# Patient Record
Sex: MALE | Race: BLACK OR AFRICAN AMERICAN | Employment: OTHER | URBAN - METROPOLITAN AREA
[De-identification: names, ages, dates, MRNs, and addresses within clinical notes are randomized per-mention and may not be internally consistent; named-entity substitution may affect disease eponyms.]

---

## 2019-05-16 ENCOUNTER — OP HISTORICAL/CONVERTED ENCOUNTER (OUTPATIENT)
Dept: OTHER | Age: 59
End: 2019-05-16

## 2019-05-24 ENCOUNTER — ED HISTORICAL/CONVERTED ENCOUNTER (OUTPATIENT)
Dept: OTHER | Age: 59
End: 2019-05-24

## 2020-01-30 ENCOUNTER — ED HISTORICAL/CONVERTED ENCOUNTER (OUTPATIENT)
Dept: OTHER | Age: 60
End: 2020-01-30

## 2020-11-05 ENCOUNTER — HOSPITAL ENCOUNTER (EMERGENCY)
Age: 60
Discharge: HOME | End: 2020-11-05
Attending: EMERGENCY MEDICINE
Payer: MEDICARE

## 2020-11-05 VITALS
RESPIRATION RATE: 16 BRPM | BODY MASS INDEX: 40.16 KG/M2 | WEIGHT: 265 LBS | SYSTOLIC BLOOD PRESSURE: 144 MMHG | TEMPERATURE: 98.4 F | HEART RATE: 66 BPM | HEIGHT: 68 IN | OXYGEN SATURATION: 97 % | DIASTOLIC BLOOD PRESSURE: 94 MMHG

## 2020-11-05 DIAGNOSIS — E08.649: Primary | ICD-10-CM

## 2020-11-05 LAB
ALBUMIN SERPL-MCNC: 3.8 G/DL (ref 3.5–5)
ALBUMIN/GLOB SERPL: 0.9 {RATIO} (ref 1.1–2.2)
ALP SERPL-CCNC: 96 U/L (ref 45–117)
ALT SERPL-CCNC: 21 U/L (ref 12–78)
ANION GAP SERPL CALC-SCNC: 8 MMOL/L (ref 5–15)
AST SERPL W P-5'-P-CCNC: 34 U/L (ref 15–37)
BASOPHILS # BLD: 0 K/UL (ref 0–0.2)
BASOPHILS NFR BLD: 0 % (ref 0–2.5)
BILIRUB SERPL-MCNC: 1.1 MG/DL (ref 0.2–1)
BUN SERPL-MCNC: 12 MG/DL (ref 6–20)
BUN/CREAT SERPL: 10 (ref 12–20)
CA-I BLD-MCNC: 9.1 MG/DL (ref 8.5–10.1)
CHLORIDE SERPL-SCNC: 106 MMOL/L (ref 97–108)
CO2 SERPL-SCNC: 27 MMOL/L (ref 21–32)
CREAT SERPL-MCNC: 1.24 MG/DL (ref 0.7–1.3)
EOSINOPHIL # BLD: 0 K/UL (ref 0–0.7)
EOSINOPHIL NFR BLD: 0 % (ref 0.9–2.9)
ERYTHROCYTE [DISTWIDTH] IN BLOOD BY AUTOMATED COUNT: 15.2 % (ref 11.5–14.5)
GLOBULIN SER CALC-MCNC: 4.1 G/DL (ref 2–4)
GLUCOSE BLD STRIP.AUTO-MCNC: 117 MG/DL (ref 65–100)
GLUCOSE BLD STRIP.AUTO-MCNC: 129 MG/DL (ref 65–100)
GLUCOSE SERPL-MCNC: 148 MG/DL (ref 65–100)
HCT VFR BLD AUTO: 46 % (ref 41–53)
HGB BLD-MCNC: 15.2 G/DL (ref 13.5–17.5)
LYMPHOCYTES # BLD: 1.3 K/UL (ref 1–4.8)
LYMPHOCYTES NFR BLD: 13 % (ref 20.5–51.1)
MCH RBC QN AUTO: 29.4 PG (ref 31–34)
MCHC RBC AUTO-ENTMCNC: 33 G/DL (ref 31–36)
MCV RBC AUTO: 89.1 FL (ref 80–100)
MONOCYTES # BLD: 0.5 K/UL (ref 0.2–2.4)
MONOCYTES NFR BLD: 5 % (ref 1.7–9.3)
NEUTS SEG # BLD: 7.9 K/UL (ref 1.8–7.7)
NEUTS SEG NFR BLD: 82 % (ref 42–75)
NRBC # BLD: 0 K/UL
NRBC BLD-RTO: 0 PER 100 WBC
PERFORMED BY, TECHID: ABNORMAL
PERFORMED BY, TECHID: ABNORMAL
PLATELET # BLD AUTO: 186 K/UL
PMV BLD AUTO: 9.7 FL (ref 6.5–11.5)
POTASSIUM SERPL-SCNC: 4.8 MMOL/L (ref 3.5–5.1)
PROT SERPL-MCNC: 7.9 G/DL (ref 6.4–8.2)
RBC # BLD AUTO: 5.16 M/UL (ref 4.5–5.9)
SODIUM SERPL-SCNC: 141 MMOL/L (ref 136–145)
WBC # BLD AUTO: 9.7 K/UL (ref 4.4–11.3)

## 2020-11-05 PROCEDURE — 85025 COMPLETE CBC W/AUTO DIFF WBC: CPT

## 2020-11-05 PROCEDURE — 36415 COLL VENOUS BLD VENIPUNCTURE: CPT

## 2020-11-05 PROCEDURE — 74011250637 HC RX REV CODE- 250/637: Performed by: EMERGENCY MEDICINE

## 2020-11-05 PROCEDURE — 99285 EMERGENCY DEPT VISIT HI MDM: CPT

## 2020-11-05 PROCEDURE — 82962 GLUCOSE BLOOD TEST: CPT

## 2020-11-05 PROCEDURE — 80053 COMPREHEN METABOLIC PANEL: CPT

## 2020-11-05 RX ORDER — NIFEDIPINE 30 MG/1
30 TABLET, EXTENDED RELEASE ORAL
Status: COMPLETED | OUTPATIENT
Start: 2020-11-05 | End: 2020-11-05

## 2020-11-05 RX ORDER — NIFEDIPINE 30 MG/1
30 TABLET, EXTENDED RELEASE ORAL DAILY
Status: DISCONTINUED | OUTPATIENT
Start: 2020-11-06 | End: 2020-11-05 | Stop reason: SDUPTHER

## 2020-11-05 RX ADMIN — NIFEDIPINE 30 MG: 30 TABLET, EXTENDED RELEASE ORAL at 18:55

## 2020-11-05 NOTE — ED TRIAGE NOTES
EMS called for unresponsive. Patients BG was 27 on arrival, unresponsive with snoring respirations. Given glucagon and D50. Last .  Pt now A/O

## 2020-11-05 NOTE — ED NOTES
Pt states he did not have anything to eat or drink today. He did not check his blood glucose prior to administering insulin. Pt also reports eating a small amount yesterday and taking scheduled insulin. Pt stated he fell yesterday but did not remember doing so. Pt denies any injury.

## 2020-11-06 NOTE — ED PROVIDER NOTES
HPI   Patient is a 63 y.o. male [address] who presents to the ER with a chief complaint of low blood sugars, failed to eat meals today  Chief Complaint   Patient presents with    Low Blood Sugar      Past Medical History:   Diagnosis Date    Chronic kidney disease     Diabetes ([address]     Hypertension        Past Surgical History:   Procedure Laterality Date    HX HIP REPLACEMENT      HX ORTHOPAEDIC           Family History:   Family history unknown: Yes       Social History     Socioeconomic History    Marital status: SINGLE     Spouse name: Not on file    Number of children: Not on file    Years of education: Not on file    Highest education level: Not on file   Occupational History    Not on file   Social Needs    Financial resource strain: Not on file    Food insecurity     Worry: Not on file     Inability: Not on file    Transportation needs     Medical: Not on file     Non-medical: Not on file   Tobacco Use    Smoking status: Never Smoker    Smokeless tobacco: Never Used   Substance and Sexual Activity    Alcohol use: Yes    Drug use: Not Currently    Sexual activity: Not Currently   Lifestyle    Physical activity     Days per week: Not on file     Minutes per session: Not on file    Stress: Not on file   Relationships    Social connections     Talks on phone: Not on file     Gets together: Not on file     Attends Mosque service: Not on file     Active member of club or organization: Not on file     Attends meetings of clubs or organizations: Not on file     Relationship status: Not on file    Intimate partner violence     Fear of current or ex partner: Not on file     Emotionally abused: Not on file     Physically abused: Not on file     Forced sexual activity: Not on file   Other Topics Concern    Not on file   Social History Narrative    Not on file         ALLERGIES: Patient has no known allergies. Review of Systems   Constitutional: Negative. HENT: Negative. Eyes: Negative. Respiratory: Negative. Cardiovascular: Negative. Gastrointestinal: Negative. Endocrine: Negative. Genitourinary: Negative. Musculoskeletal: Negative. Allergic/Immunologic: Negative. Neurological: Negative. Hematological: Negative. Psychiatric/Behavioral: Negative. Vitals:    11/05/20 1830 11/05/20 1855 11/05/20 1900 11/05/20 1930   BP: (!) 162/95 (!) 162/95 (!) 185/109 (!) 144/94   Pulse: 70 77 82 66   Resp: 18  16 16   Temp:       SpO2: 98%  98% 97%   Weight:       Height:                Physical Exam  Vitals signs and nursing note reviewed. Constitutional:       Appearance: Normal appearance. He is normal weight. HENT:      Head: Normocephalic. Right Ear: Tympanic membrane normal.      Left Ear: Tympanic membrane normal.      Nose: Nose normal.      Mouth/Throat:      Mouth: Mucous membranes are moist.   Eyes:      Extraocular Movements: Extraocular movements intact. Pupils: Pupils are equal, round, and reactive to light. Neck:      Musculoskeletal: Normal range of motion and neck supple. Cardiovascular:      Rate and Rhythm: Normal rate and regular rhythm. Pulses: Normal pulses. Heart sounds: Normal heart sounds. Pulmonary:      Effort: Pulmonary effort is normal.      Breath sounds: Normal breath sounds. Abdominal:      General: Abdomen is flat. Bowel sounds are normal.      Palpations: Abdomen is soft. Musculoskeletal: Normal range of motion. Neurological:      General: No focal deficit present. Mental Status: He is alert and oriented to person, place, and time. Symptoms improved, sugars much improved    MDM       Procedures      ICD-10-CM ICD-9-CM    1.  Diabetes mellitus due to underlying condition with hypoglycemia without coma, unspecified whether long term insulin use ([facility])  E08.649 249.80

## 2020-11-06 NOTE — ED NOTES
The patient is sitting on the bed talking to staff and eating a sandwich tray. He is alert and oriented X 4 with even rise and fall of the chest noted.

## 2020-12-21 ENCOUNTER — HOSPITAL ENCOUNTER (EMERGENCY)
Age: 60
Discharge: HOME | End: 2020-12-21
Attending: EMERGENCY MEDICINE
Payer: MEDICARE

## 2020-12-21 VITALS
OXYGEN SATURATION: 100 % | SYSTOLIC BLOOD PRESSURE: 136 MMHG | TEMPERATURE: 98.1 F | RESPIRATION RATE: 18 BRPM | BODY MASS INDEX: 40.16 KG/M2 | HEART RATE: 85 BPM | WEIGHT: 265 LBS | DIASTOLIC BLOOD PRESSURE: 89 MMHG | HEIGHT: 68 IN
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
## 2020-12-21 PROCEDURE — 90714 TD VACC NO PRESV 7 YRS+ IM: CPT | Performed by: EMERGENCY MEDICINE

## 2020-12-21 PROCEDURE — 90471 IMMUNIZATION ADMIN: CPT

## 2020-12-21 PROCEDURE — 74011250637 HC RX REV CODE- 250/637: Performed by: EMERGENCY MEDICINE

## 2020-12-21 PROCEDURE — 99283 EMERGENCY DEPT VISIT LOW MDM: CPT

## 2020-12-21 PROCEDURE — 74011250636 HC RX REV CODE- 250/636: Performed by: EMERGENCY MEDICINE

## 2020-12-21 RX ORDER — POTASSIUM CHLORIDE 20 MEQ/1
TABLET, EXTENDED RELEASE ORAL 2 TIMES DAILY
COMMUNITY

## 2020-12-21 RX ORDER — FUROSEMIDE 40 MG/1
40 TABLET ORAL 2 TIMES DAILY
COMMUNITY
End: 2021-12-12

## 2020-12-21 RX ORDER — CEPHALEXIN 500 MG/1
500 CAPSULE ORAL 3 TIMES DAILY
Qty: 21 CAP | Refills: 0 | Status: SHIPPED | OUTPATIENT
Start: 2020-12-21 | End: 2020-12-28

## 2020-12-21 RX ORDER — CEPHALEXIN 250 MG/1
500 CAPSULE ORAL
Status: COMPLETED | OUTPATIENT
Start: 2020-12-21 | End: 2020-12-21

## 2020-12-21 RX ADMIN — TETANUS AND DIPHTHERIA TOXOIDS ADSORBED 0.5 ML: 2; 2 INJECTION INTRAMUSCULAR at 01:29

## 2020-12-21 RX ADMIN — CEPHALEXIN 500 MG: 250 CAPSULE ORAL at 01:29

## 2020-12-21 NOTE — ED PROVIDER NOTES
Patient C/O bed bug bites to his legs tonight. He sprayed them. No other complaints. Past Medical History:   Diagnosis Date    Chronic kidney disease     Diabetes ([address]     Hypertension        Past Surgical History:   Procedure Laterality Date    HX HIP REPLACEMENT      HX ORTHOPAEDIC           Family History:   Family history unknown: Yes       Social History     Socioeconomic History    Marital status: SINGLE     Spouse name: Not on file    Number of children: Not on file    Years of education: Not on file    Highest education level: Not on file   Occupational History    Not on file   Social Needs    Financial resource strain: Not on file    Food insecurity     Worry: Not on file     Inability: Not on file    Transportation needs     Medical: Not on file     Non-medical: Not on file   Tobacco Use    Smoking status: Never Smoker    Smokeless tobacco: Never Used   Substance and Sexual Activity    Alcohol use: Yes    Drug use: Not Currently    Sexual activity: Not Currently   Lifestyle    Physical activity     Days per week: Not on file     Minutes per session: Not on file    Stress: Not on file   Relationships    Social connections     Talks on phone: Not on file     Gets together: Not on file     Attends Tenriism service: Not on file     Active member of club or organization: Not on file     Attends meetings of clubs or organizations: Not on file     Relationship status: Not on file    Intimate partner violence     Fear of current or ex partner: Not on file     Emotionally abused: Not on file     Physically abused: Not on file     Forced sexual activity: Not on file   Other Topics Concern    Not on file   Social History Narrative    Not on file         ALLERGIES: Patient has no known allergies. Review of Systems   Constitutional: Negative. HENT: Negative. Eyes: Negative. Respiratory: Negative. Cardiovascular: Negative. Endocrine: Negative.     Genitourinary: Negative. Musculoskeletal: Negative. Skin: Positive for rash. S/P bed bug bites to legs   Allergic/Immunologic: Negative. Neurological: Negative. Hematological: Negative. Psychiatric/Behavioral: Negative. Vitals:    12/21/20 0109   BP: (!) 134/90   Pulse: 89   Resp: 18   Temp: 98 °F (36.7 °C)   SpO2: 98%   Weight: 120.2 kg (265 lb)   Height: 5' 8\" (1.727 m)            Physical Exam  Vitals signs and nursing note reviewed. HENT:      Head: Normocephalic and atraumatic. Neck:      Musculoskeletal: Normal range of motion and neck supple. Cardiovascular:      Rate and Rhythm: Normal rate and regular rhythm. Pulses: Normal pulses. Heart sounds: Normal heart sounds. Pulmonary:      Effort: Pulmonary effort is normal.      Breath sounds: Normal breath sounds. Abdominal:      General: Abdomen is flat. Bowel sounds are normal.      Palpations: Abdomen is soft. Musculoskeletal: Normal range of motion. General: No swelling, tenderness or deformity. Skin:     General: Skin is warm and dry. Comments: Small red spots noted on left lower leg   Neurological:      General: No focal deficit present. Mental Status: He is oriented to person, place, and time. Mental status is at baseline.    Psychiatric:         Mood and Affect: Mood normal.         Behavior: Behavior normal.          MDM       Procedures

## 2020-12-21 NOTE — ED TRIAGE NOTES
Patient states he has been renting a house for 10 years. States he noticed bed bugs, roaches and mold in the bathroom last month. States they have been biting him since that time. States the bites are sore and itching.

## 2020-12-21 NOTE — ED NOTES
Discharged home to self. Ambulatory out of ED. VS WDL.  0 s/s acute distress. Respirations even and unlabored. Discharge instructions and follow up care reviewed. Patient receptive and demonstrated knowledge of instruction via teach-back method.

## 2021-01-24 ENCOUNTER — HOSPITAL ENCOUNTER (EMERGENCY)
Age: 61
Discharge: HOME | End: 2021-01-25
Attending: EMERGENCY MEDICINE
Payer: MEDICARE

## 2021-01-24 DIAGNOSIS — R73.9 HYPERGLYCEMIA: Primary | ICD-10-CM

## 2021-01-24 LAB
BACTERIA URNS QL MICRO: NEGATIVE /HPF
RBC #/AREA URNS HPF: NORMAL /HPF (ref 0–3)
WBC URNS QL MICRO: NORMAL /HPF (ref 0–5)

## 2021-01-24 PROCEDURE — 99285 EMERGENCY DEPT VISIT HI MDM: CPT

## 2021-01-24 PROCEDURE — 96374 THER/PROPH/DIAG INJ IV PUSH: CPT

## 2021-01-24 PROCEDURE — 87086 URINE CULTURE/COLONY COUNT: CPT

## 2021-01-24 PROCEDURE — 81001 URINALYSIS AUTO W/SCOPE: CPT

## 2021-01-25 VITALS
HEART RATE: 78 BPM | DIASTOLIC BLOOD PRESSURE: 74 MMHG | RESPIRATION RATE: 18 BRPM | WEIGHT: 260 LBS | HEIGHT: 68 IN | BODY MASS INDEX: 39.4 KG/M2 | OXYGEN SATURATION: 100 % | SYSTOLIC BLOOD PRESSURE: 114 MMHG | TEMPERATURE: 98 F

## 2021-01-25 LAB
ANION GAP SERPL CALC-SCNC: 11 MMOL/L (ref 5–15)
APPEARANCE UR: CLEAR
BASOPHILS # BLD: 0 K/UL (ref 0–0.2)
BASOPHILS NFR BLD: 0 % (ref 0–2.5)
BILIRUB UR QL: NEGATIVE
BUN SERPL-MCNC: 18 MG/DL (ref 6–20)
BUN/CREAT SERPL: 11 (ref 12–20)
CA-I BLD-MCNC: 8.2 MG/DL (ref 8.5–10.1)
CHLORIDE SERPL-SCNC: 99 MMOL/L (ref 97–108)
CO2 SERPL-SCNC: 22 MMOL/L (ref 21–32)
COLOR UR: ABNORMAL
CREAT SERPL-MCNC: 1.58 MG/DL (ref 0.7–1.3)
EOSINOPHIL # BLD: 0.1 K/UL (ref 0–0.7)
EOSINOPHIL NFR BLD: 1 % (ref 0.9–2.9)
ERYTHROCYTE [DISTWIDTH] IN BLOOD BY AUTOMATED COUNT: 14.2 % (ref 11.5–14.5)
GLUCOSE BLD STRIP.AUTO-MCNC: 265 MG/DL (ref 65–100)
GLUCOSE BLD STRIP.AUTO-MCNC: 560 MG/DL (ref 65–100)
GLUCOSE SERPL-MCNC: 585 MG/DL (ref 65–100)
GLUCOSE UR STRIP.AUTO-MCNC: >1000 MG/DL
HCT VFR BLD AUTO: 44 % (ref 41–53)
HGB BLD-MCNC: 14.5 G/DL (ref 13.5–17.5)
HGB UR QL STRIP: ABNORMAL
KETONES UR QL STRIP.AUTO: NEGATIVE MG/DL
LEUKOCYTE ESTERASE UR QL STRIP.AUTO: NEGATIVE
LYMPHOCYTES # BLD: 2.1 K/UL (ref 1–4.8)
LYMPHOCYTES NFR BLD: 28 % (ref 20.5–51.1)
MCH RBC QN AUTO: 29.6 PG (ref 31–34)
MCHC RBC AUTO-ENTMCNC: 32.9 G/DL (ref 31–36)
MCV RBC AUTO: 90.1 FL (ref 80–100)
MONOCYTES # BLD: 1 K/UL (ref 0.2–2.4)
MONOCYTES NFR BLD: 13 % (ref 1.7–9.3)
NEUTS SEG # BLD: 4.5 K/UL (ref 1.8–7.7)
NEUTS SEG NFR BLD: 58 % (ref 42–75)
NITRITE UR QL STRIP.AUTO: NEGATIVE
NRBC # BLD: 0.01 K/UL
NRBC BLD-RTO: 0.2 PER 100 WBC
PERFORMED BY, TECHID: ABNORMAL
PERFORMED BY, TECHID: ABNORMAL
PH UR STRIP: 5.5 [PH] (ref 5–8)
PLATELET # BLD AUTO: 141 K/UL
PMV BLD AUTO: 10.1 FL (ref 6.5–11.5)
POTASSIUM SERPL-SCNC: 4.6 MMOL/L (ref 3.5–5.1)
PROT UR STRIP-MCNC: NEGATIVE MG/DL
RBC # BLD AUTO: 4.89 M/UL (ref 4.5–5.9)
SODIUM SERPL-SCNC: 132 MMOL/L (ref 136–145)
SP GR UR REFRACTOMETRY: 1.03 (ref 1–1.03)
UROBILINOGEN UR QL STRIP.AUTO: 1 EU/DL (ref 0.2–1)
WBC # BLD AUTO: 7.7 K/UL (ref 4.4–11.3)

## 2021-01-25 PROCEDURE — 80048 BASIC METABOLIC PNL TOTAL CA: CPT

## 2021-01-25 PROCEDURE — 36415 COLL VENOUS BLD VENIPUNCTURE: CPT

## 2021-01-25 PROCEDURE — 96374 THER/PROPH/DIAG INJ IV PUSH: CPT

## 2021-01-25 PROCEDURE — 85025 COMPLETE CBC W/AUTO DIFF WBC: CPT

## 2021-01-25 PROCEDURE — 82962 GLUCOSE BLOOD TEST: CPT

## 2021-01-25 PROCEDURE — 74011636637 HC RX REV CODE- 636/637: Performed by: EMERGENCY MEDICINE

## 2021-01-25 PROCEDURE — 96375 TX/PRO/DX INJ NEW DRUG ADDON: CPT

## 2021-01-25 PROCEDURE — 74011250636 HC RX REV CODE- 250/636: Performed by: EMERGENCY MEDICINE

## 2021-01-25 RX ORDER — KETOROLAC TROMETHAMINE 30 MG/ML
30 INJECTION, SOLUTION INTRAMUSCULAR; INTRAVENOUS
Status: COMPLETED | OUTPATIENT
Start: 2021-01-25 | End: 2021-01-25

## 2021-01-25 RX ORDER — SODIUM CHLORIDE 9 MG/ML
150 INJECTION, SOLUTION INTRAVENOUS ONCE
Status: COMPLETED | OUTPATIENT
Start: 2021-01-25 | End: 2021-01-25

## 2021-01-25 RX ADMIN — SODIUM CHLORIDE 150 ML/HR: 9 INJECTION, SOLUTION INTRAVENOUS at 01:02

## 2021-01-25 RX ADMIN — INSULIN HUMAN 10 UNITS: 100 INJECTION, SOLUTION PARENTERAL at 01:01

## 2021-01-25 RX ADMIN — SODIUM CHLORIDE 1000 ML: 9 INJECTION, SOLUTION INTRAVENOUS at 01:01

## 2021-01-25 RX ADMIN — KETOROLAC TROMETHAMINE 30 MG: 30 INJECTION, SOLUTION INTRAMUSCULAR at 02:27

## 2021-01-25 RX ADMIN — INSULIN HUMAN 10 UNITS: 100 INJECTION, SOLUTION PARENTERAL at 01:02

## 2021-01-25 NOTE — ED TRIAGE NOTES
60/M c/o urinary frequency and penile pain x1 week    Bladder scan showed less than 91ml, MD made aware 24.9

## 2021-01-25 NOTE — ED PROVIDER NOTES
Patient presents with complaint of dysuria and incontinence for 1 week. No fever or chills. No other acute complaints. Past Medical History:   Diagnosis Date    Chronic kidney disease     Diabetes ([address]     Hypertension        Past Surgical History:   Procedure Laterality Date    HX HIP REPLACEMENT      HX ORTHOPAEDIC           Family History:   Family history unknown: Yes       Social History     Socioeconomic History    Marital status: SINGLE     Spouse name: Not on file    Number of children: Not on file    Years of education: Not on file    Highest education level: Not on file   Occupational History    Not on file   Social Needs    Financial resource strain: Not on file    Food insecurity     Worry: Not on file     Inability: Not on file    Transportation needs     Medical: Not on file     Non-medical: Not on file   Tobacco Use    Smoking status: Never Smoker    Smokeless tobacco: Never Used   Substance and Sexual Activity    Alcohol use: Yes    Drug use: Not Currently    Sexual activity: Not Currently   Lifestyle    Physical activity     Days per week: Not on file     Minutes per session: Not on file    Stress: Not on file   Relationships    Social connections     Talks on phone: Not on file     Gets together: Not on file     Attends Gnosticism service: Not on file     Active member of club or organization: Not on file     Attends meetings of clubs or organizations: Not on file     Relationship status: Not on file    Intimate partner violence     Fear of current or ex partner: Not on file     Emotionally abused: Not on file     Physically abused: Not on file     Forced sexual activity: Not on file   Other Topics Concern    Not on file   Social History Narrative    Not on file         ALLERGIES: Patient has no known allergies. Review of Systems   Constitutional: Negative. Negative for fever. HENT: Negative. Eyes: Negative. Respiratory: Negative.     Cardiovascular: Negative. Endocrine: Negative. Genitourinary: Positive for dysuria, frequency and penile pain. Negative for discharge. Musculoskeletal: Negative. Skin: Negative. Allergic/Immunologic: Negative. Neurological: Negative. Hematological: Negative. Psychiatric/Behavioral: Negative. Vitals:    01/24/21 2312   BP: 134/76   Pulse: 84   Resp: 16   Temp: 97.8 °F (36.6 °C)   SpO2: 99%   Weight: 117.9 kg (260 lb)   Height: 5' 8\" (1.727 m)            Physical Exam  Vitals signs and nursing note reviewed. HENT:      Head: Normocephalic and atraumatic. Neck:      Musculoskeletal: Normal range of motion and neck supple. Cardiovascular:      Rate and Rhythm: Normal rate and regular rhythm. Pulses: Normal pulses. Heart sounds: Normal heart sounds. Pulmonary:      Effort: Pulmonary effort is normal.      Breath sounds: Normal breath sounds. Abdominal:      General: Abdomen is flat. Bowel sounds are normal.      Palpations: Abdomen is soft. Musculoskeletal: Normal range of motion. Skin:     General: Skin is warm and dry. Neurological:      General: No focal deficit present. Mental Status: He is oriented to person, place, and time. Mental status is at baseline.    Psychiatric:         Mood and Affect: Mood normal.         Behavior: Behavior normal.          MDM  Number of Diagnoses or Management Options  Risk of Complications, Morbidity, and/or Mortality  Presenting problems: low  Diagnostic procedures: low  Management options: low    Patient Progress  Patient progress: stable         Procedures

## 2021-01-26 LAB
BACTERIA SPEC CULT: NORMAL
COLONY COUNT,CNT: NORMAL
COLONY COUNT,CNT: NORMAL
SPECIAL REQUESTS,SREQ: NORMAL

## 2021-02-09 ENCOUNTER — HOSPITAL ENCOUNTER (EMERGENCY)
Age: 61
Discharge: HOME | End: 2021-02-09
Payer: MEDICARE

## 2021-02-09 VITALS
DIASTOLIC BLOOD PRESSURE: 93 MMHG | WEIGHT: 260 LBS | RESPIRATION RATE: 18 BRPM | OXYGEN SATURATION: 98 % | BODY MASS INDEX: 39.4 KG/M2 | TEMPERATURE: 98.7 F | HEART RATE: 93 BPM | HEIGHT: 68 IN | SYSTOLIC BLOOD PRESSURE: 148 MMHG

## 2021-02-09 DIAGNOSIS — S00.411A EAR ABRASION, RIGHT, INITIAL ENCOUNTER: Primary | ICD-10-CM

## 2021-02-09 PROCEDURE — 99282 EMERGENCY DEPT VISIT SF MDM: CPT

## 2021-02-09 RX ORDER — BACITRACIN 500 [USP'U]/G
OINTMENT TOPICAL 3 TIMES DAILY
Qty: 1 TUBE | Refills: 0 | Status: SHIPPED | OUTPATIENT
Start: 2021-02-09 | End: 2021-02-19

## 2021-02-09 NOTE — DISCHARGE INSTRUCTIONS
Try placing some cotton balls behind the ears under the straps for your mask you me purchase a cap that has buttons on the sides to put the mask strings on instead of putting them behind the years

## 2021-02-09 NOTE — ED PROVIDER NOTES
EMERGENCY DEPARTMENT HISTORY AND PHYSICAL EXAM      Date: 2/9/2021  Patient Name: Hernesto Mcallister    History of Presenting Illness     Chief Complaint   Patient presents with    Skin Problem       History Provided By: Patient    HPI: Hernesto Mcallister, 63 y.o. male with a past medical history significant diabetes and hypertension presents to the ED with cc of soreness behind his ears; patient wears facemask with elastic ties for 15 hours a day and he he is required to do this while he is at work; no fever no chills    There are no other complaints, changes, or physical findings at this time. PCP: Dank Camacho PA-C    No current facility-administered medications on file prior to encounter. Current Outpatient Medications on File Prior to Encounter   Medication Sig Dispense Refill    furosemide (LASIX) 40 mg tablet Take 40 mg by mouth two (2) times a day.  potassium chloride (K-DUR, KLOR-CON) 20 mEq tablet Take  by mouth two (2) times a day.  insulin NPH/insulin regular (HumuLIN 70/30 U-100 Insulin) 100 unit/mL (70-30) injection 49 Units by SubCUTAneous route Every morning.  insulin NPH/insulin regular (HumuLIN 70/30 U-100 Insulin) 100 unit/mL (70-30) injection 45 Units by SubCUTAneous route every evening. Past History     Past Medical History:  Past Medical History:   Diagnosis Date    Chronic kidney disease     Diabetes ([address]     Hypertension        Past Surgical History:  Past Surgical History:   Procedure Laterality Date    HX HIP REPLACEMENT      HX ORTHOPAEDIC         Family History:  Family History   Family history unknown: Yes       Social History:  Social History     Tobacco Use    Smoking status: Never Smoker    Smokeless tobacco: Never Used   Substance Use Topics    Alcohol use: Not Currently    Drug use: Not Currently       Allergies:  No Known Allergies      Review of Systems     Review of Systems   Constitutional: Negative for appetite change and fatigue. HENT: Positive for ear pain. Negative for facial swelling. Eyes: Negative for photophobia and discharge. Respiratory: Negative for cough and shortness of breath. Cardiovascular: Negative for chest pain and leg swelling. Gastrointestinal: Negative for abdominal pain and nausea. Endocrine: Negative for polydipsia and polyuria. Genitourinary: Negative for dysuria and urgency. Musculoskeletal: Negative for back pain and neck pain. Skin: Positive for wound. Negative for color change. Neurological: Negative for weakness and numbness. Psychiatric/Behavioral: Negative. Physical Exam     Physical Exam  Vitals signs and nursing note reviewed. Constitutional:       Appearance: Normal appearance. HENT:      Head: Normocephalic and atraumatic. Mouth/Throat:      Mouth: Mucous membranes are moist.      Pharynx: Oropharynx is clear. Eyes:      Extraocular Movements: Extraocular movements intact. Conjunctiva/sclera: Conjunctivae normal.      Pupils: Pupils are equal, round, and reactive to light. Neck:      Musculoskeletal: Normal range of motion and neck supple. Cardiovascular:      Rate and Rhythm: Normal rate and regular rhythm. Pulses: Normal pulses. Heart sounds: Normal heart sounds. Pulmonary:      Effort: Pulmonary effort is normal.      Breath sounds: Normal breath sounds. Abdominal:      General: Bowel sounds are normal.      Palpations: Abdomen is soft. Musculoskeletal: Normal range of motion. Skin:     General: Skin is warm. Capillary Refill: Capillary refill takes less than 2 seconds. Findings: Abrasion present. Neurological:      General: No focal deficit present. Mental Status: He is alert and oriented to person, place, and time.    Psychiatric:         Mood and Affect: Mood normal.         Behavior: Behavior normal.         Lab and Diagnostic Study Results     Labs -   No results found for this or any previous visit (from the past 12 hour(s)). Radiologic Studies -   @lastxrresult@  CT Results  (Last 48 hours)    None        CXR Results  (Last 48 hours)    None            Medical Decision Making   - I am the first provider for this patient. - I reviewed the vital signs, available nursing notes, past medical history, past surgical history, family history and social history. - Initial assessment performed. The patients presenting problems have been discussed, and they are in agreement with the care plan formulated and outlined with them. I have encouraged them to ask questions as they arise throughout their visit. Vital Signs-Reviewed the patient's vital signs. Patient Vitals for the past 12 hrs:   Temp Pulse Resp BP SpO2   02/09/21 1352 98.7 °F (37.1 °C) 93 18 (!) 148/93 98 %       Records Reviewed: Nursing Notes    The patient presents with skin irritation with a differential diagnosis of cellulitis, abscess, laceration      ED Course:          Provider Notes (Medical Decision Making): MDM       Procedures   Medical Decision Makingedical Decision Making  Performed by: Filipe Her MD  PROCEDURES:  Procedures       Disposition   Disposition: Condition stable  DC- Adult Discharges: All of the diagnostic tests were reviewed and questions answered. Diagnosis, care plan and treatment options were discussed. The patient understands the instructions and will follow up as directed. The patients results have been reviewed with them. They have been counseled regarding their diagnosis. The patient verbally convey understanding and agreement of the signs, symptoms, diagnosis, treatment and prognosis and additionally agrees to follow up as recommended with their PCP in 24 - 48 hours. They also agree with the care-plan and convey that all of their questions have been answered.   I have also put together some discharge instructions for them that include: 1) educational information regarding their diagnosis, 2) how to care for their diagnosis at home, as well a 3) list of reasons why they would want to return to the ED prior to their follow-up appointment, should their condition change. Discharged    DISCHARGE PLAN:  1. Current Discharge Medication List      START taking these medications    Details   bacitracin (BACITRACIN) 500 unit/gram oint Apply  to affected area three (3) times daily for 10 days. Apply to affected area  Qty: 1 Tube, Refills: 0         CONTINUE these medications which have NOT CHANGED    Details   furosemide (LASIX) 40 mg tablet Take 40 mg by mouth two (2) times a day. potassium chloride (K-DUR, KLOR-CON) 20 mEq tablet Take  by mouth two (2) times a day. !! insulin NPH/insulin regular (HumuLIN 70/30 U-100 Insulin) 100 unit/mL (70-30) injection 49 Units by SubCUTAneous route Every morning. !! insulin NPH/insulin regular (HumuLIN 70/30 U-100 Insulin) 100 unit/mL (70-30) injection 45 Units by SubCUTAneous route every evening. !! - Potential duplicate medications found. Please discuss with provider. 2.   Follow-up Information     Follow up With Specialties Details Why Contact Info    Gerldine Gosselin, PA-C Physician Assistant   [phone]          3. Return to ED if worse   4. Current Discharge Medication List      START taking these medications    Details   bacitracin (BACITRACIN) 500 unit/gram oint Apply  to affected area three (3) times daily for 10 days. Apply to affected area  Qty: 1 Tube, Refills: 0               Diagnosis     Clinical Impression:   1. Ear abrasion, right, initial encounter        Attestations:    Kendall Alicia MD    Please note that this dictation was completed with [email or web address], the 800APP voice recognition software. Quite often unanticipated grammatical, syntax, homophones, and other interpretive errors are inadvertently transcribed by the computer software. Please disregard these errors.   Please excuse any errors that have escaped final proofreading. Thank you.

## 2021-02-09 NOTE — LETTER
[address] 
[facility] EMERGENCY DEPT 
[address] [place] 
[phone] Work/School Note Date: 2/9/2021 To Whom It May concern: 
 
 
Rahul Harvey was seen and treated today in the emergency room by the following provider(s): 
No providers found. Rahul Harvey is excused from work/school on 02/09/21. He is clear to return to work/school on 02/10/21. Sincerely, Monica Jimenez MD

## 2021-02-13 ENCOUNTER — HOSPITAL ENCOUNTER (EMERGENCY)
Age: 61
Discharge: HOME | End: 2021-02-13
Attending: EMERGENCY MEDICINE
Payer: MEDICARE

## 2021-02-13 VITALS
RESPIRATION RATE: 18 BRPM | DIASTOLIC BLOOD PRESSURE: 111 MMHG | SYSTOLIC BLOOD PRESSURE: 150 MMHG | BODY MASS INDEX: 41.78 KG/M2 | HEART RATE: 96 BPM | TEMPERATURE: 98.3 F | OXYGEN SATURATION: 100 % | HEIGHT: 66 IN | WEIGHT: 260 LBS

## 2021-02-13 DIAGNOSIS — R73.9 HYPERGLYCEMIA: Primary | ICD-10-CM

## 2021-02-13 LAB
ANION GAP SERPL CALC-SCNC: 6 MMOL/L (ref 5–15)
APPEARANCE UR: CLEAR
BASOPHILS # BLD: 0 K/UL (ref 0–0.2)
BASOPHILS NFR BLD: 1 % (ref 0–2.5)
BILIRUB UR QL: NEGATIVE
BUN SERPL-MCNC: 15 MG/DL (ref 6–20)
BUN/CREAT SERPL: 11 (ref 12–20)
CA-I BLD-MCNC: 9.4 MG/DL (ref 8.5–10.1)
CHLORIDE SERPL-SCNC: 100 MMOL/L (ref 97–108)
CO2 SERPL-SCNC: 28 MMOL/L (ref 21–32)
COLOR UR: ABNORMAL
CREAT SERPL-MCNC: 1.42 MG/DL (ref 0.7–1.3)
EOSINOPHIL # BLD: 0.1 K/UL (ref 0–0.7)
EOSINOPHIL NFR BLD: 1 % (ref 0.9–2.9)
ERYTHROCYTE [DISTWIDTH] IN BLOOD BY AUTOMATED COUNT: 13.6 % (ref 11.5–14.5)
GLUCOSE BLD STRIP.AUTO-MCNC: 295 MG/DL (ref 65–100)
GLUCOSE BLD STRIP.AUTO-MCNC: 553 MG/DL (ref 65–100)
GLUCOSE SERPL-MCNC: 565 MG/DL (ref 65–100)
GLUCOSE UR STRIP.AUTO-MCNC: >1000 MG/DL
HCT VFR BLD AUTO: 48.1 % (ref 41–53)
HGB BLD-MCNC: 15.9 G/DL (ref 13.5–17.5)
HGB UR QL STRIP: NEGATIVE
KETONES UR QL STRIP.AUTO: NEGATIVE MG/DL
LEUKOCYTE ESTERASE UR QL STRIP.AUTO: NEGATIVE
LYMPHOCYTES # BLD: 2.1 K/UL (ref 1–4.8)
LYMPHOCYTES NFR BLD: 34 % (ref 20.5–51.1)
MCH RBC QN AUTO: 29.1 PG (ref 31–34)
MCHC RBC AUTO-ENTMCNC: 33 G/DL (ref 31–36)
MCV RBC AUTO: 88.3 FL (ref 80–100)
MONOCYTES # BLD: 0.5 K/UL (ref 0.2–2.4)
MONOCYTES NFR BLD: 8 % (ref 1.7–9.3)
NEUTS SEG # BLD: 3.6 K/UL (ref 1.8–7.7)
NEUTS SEG NFR BLD: 56 % (ref 42–75)
NITRITE UR QL STRIP.AUTO: NEGATIVE
NRBC # BLD: 0.01 K/UL
NRBC BLD-RTO: 0.2 PER 100 WBC
PERFORMED BY, TECHID: ABNORMAL
PERFORMED BY, TECHID: ABNORMAL
PH UR STRIP: 5.5 [PH] (ref 5–8)
PLATELET # BLD AUTO: 193 K/UL
PMV BLD AUTO: 10 FL (ref 6.5–11.5)
POTASSIUM SERPL-SCNC: 5.9 MMOL/L (ref 3.5–5.1)
PROT UR STRIP-MCNC: NEGATIVE MG/DL
RBC # BLD AUTO: 5.45 M/UL (ref 4.5–5.9)
SODIUM SERPL-SCNC: 134 MMOL/L (ref 136–145)
SP GR UR REFRACTOMETRY: 1
SP GR UR REFRACTOMETRY: 1 (ref 1–1.03)
UROBILINOGEN UR QL STRIP.AUTO: 0.2 EU/DL (ref 0.2–1)
WBC # BLD AUTO: 6.3 K/UL (ref 4.4–11.3)

## 2021-02-13 PROCEDURE — 82962 GLUCOSE BLOOD TEST: CPT

## 2021-02-13 PROCEDURE — 80048 BASIC METABOLIC PNL TOTAL CA: CPT

## 2021-02-13 PROCEDURE — 74011250636 HC RX REV CODE- 250/636: Performed by: EMERGENCY MEDICINE

## 2021-02-13 PROCEDURE — 74011636637 HC RX REV CODE- 636/637: Performed by: EMERGENCY MEDICINE

## 2021-02-13 PROCEDURE — 96374 THER/PROPH/DIAG INJ IV PUSH: CPT

## 2021-02-13 PROCEDURE — 85025 COMPLETE CBC W/AUTO DIFF WBC: CPT

## 2021-02-13 PROCEDURE — 96361 HYDRATE IV INFUSION ADD-ON: CPT

## 2021-02-13 PROCEDURE — 81003 URINALYSIS AUTO W/O SCOPE: CPT

## 2021-02-13 PROCEDURE — 99284 EMERGENCY DEPT VISIT MOD MDM: CPT

## 2021-02-13 RX ORDER — TERBINAFINE HYDROCHLORIDE 250 MG/1
250 TABLET ORAL DAILY
Qty: 15 TAB | Refills: 1 | Status: SHIPPED | OUTPATIENT
Start: 2021-02-13

## 2021-02-13 RX ADMIN — INSULIN HUMAN 30 UNITS: 100 INJECTION, SUSPENSION SUBCUTANEOUS at 16:05

## 2021-02-13 RX ADMIN — SODIUM CHLORIDE 1000 ML: 9 INJECTION, SOLUTION INTRAVENOUS at 14:11

## 2021-02-13 RX ADMIN — INSULIN HUMAN 10 UNITS: 100 INJECTION, SOLUTION PARENTERAL at 14:10

## 2021-02-13 NOTE — ED NOTES
Pt established a place to go and was provided d/c instructions. Pt discharged with verbal understanding of discharge instructions. Pt ambulatory and feels comfortable being discharged. Pt denies any questions or complaints.

## 2021-02-13 NOTE — ED TRIAGE NOTES
Having urinary urgency, odor in urine, \"penis and nuts irritated\" and under stomach skin irritated, no drainage,

## 2021-02-13 NOTE — ED PROVIDER NOTES
EMERGENCY DEPARTMENT HISTORY AND PHYSICAL EXAM      Date: 2/13/2021  Patient Name: Olga Lidia Corral    History of Presenting Illness     Chief Complaint   Patient presents with    Urinary Frequency       History Provided By: Patient    HPI: Olga Lidia Corral, 63 y.o. male with a past medical history significant diabetes, hypertension and renal insufficiency presents to the ED with cc of urinary urgency and frequency. This is been ongoing problem for several months now worsened by oral furosemide. Patient also notes irritation of bilateral inguinal areas and foreskin itching and burning. There are no other complaints, changes, or physical findings at this time. PCP: Shelia Maravilla PA-C    No current facility-administered medications on file prior to encounter. Current Outpatient Medications on File Prior to Encounter   Medication Sig Dispense Refill    insulin NPH/insulin regular (HumuLIN 70/30 U-100 Insulin) 100 unit/mL (70-30) injection 49 Units by SubCUTAneous route Every morning.  insulin NPH/insulin regular (HumuLIN 70/30 U-100 Insulin) 100 unit/mL (70-30) injection 45 Units by SubCUTAneous route every evening.  furosemide (LASIX) 40 mg tablet Take 40 mg by mouth two (2) times a day.  potassium chloride (K-DUR, KLOR-CON) 20 mEq tablet Take  by mouth two (2) times a day.  bacitracin (BACITRACIN) 500 unit/gram oint Apply  to affected area three (3) times daily for 10 days.  Apply to affected area 1 Tube 0       Past History     Past Medical History:  Past Medical History:   Diagnosis Date    Chronic kidney disease     Diabetes ([address]     Hypertension        Past Surgical History:  Past Surgical History:   Procedure Laterality Date    HX HIP REPLACEMENT      HX ORTHOPAEDIC         Family History:  Family History   Family history unknown: Yes       Social History:  Social History     Tobacco Use    Smoking status: Never Smoker    Smokeless tobacco: Never Used   Substance Use Topics    Alcohol use: Not Currently    Drug use: Not Currently       Allergies:  No Known Allergies      Review of Systems   Review of all other systems negative  Review of Systems    Physical Exam   Pleasant -American male no immediate distress  Physical Exam  Vitals signs and nursing note reviewed. Constitutional:       General: He is not in acute distress. Appearance: Normal appearance. He is not ill-appearing or toxic-appearing. HENT:      Head: Normocephalic and atraumatic. Mouth/Throat:      Mouth: Mucous membranes are moist.   Eyes:      Conjunctiva/sclera: Conjunctivae normal.   Neck:      Musculoskeletal: Normal range of motion and neck supple. No neck rigidity or muscular tenderness. Vascular: No carotid bruit. Cardiovascular:      Rate and Rhythm: Normal rate and regular rhythm. Pulses: Normal pulses. Heart sounds: Normal heart sounds. Pulmonary:      Effort: Pulmonary effort is normal. No respiratory distress. Breath sounds: Normal breath sounds. No wheezing, rhonchi or rales. Abdominal:      General: Abdomen is flat. There is no distension. Palpations: Abdomen is soft. There is no mass. Tenderness: There is no abdominal tenderness. There is no right CVA tenderness, left CVA tenderness, guarding or rebound. Hernia: No hernia is present. Genitourinary:     Comments: Skin changes of tinea cruris; uncircumcised mild inflammation of the foreskin and glans  Musculoskeletal: Normal range of motion. Skin:     General: Skin is warm and dry. Neurological:      General: No focal deficit present. Mental Status: He is alert and oriented to person, place, and time. Mental status is at baseline. Psychiatric:         Mood and Affect: Mood normal.         Behavior: Behavior normal.         Thought Content:  Thought content normal.         Lab and Diagnostic Study Results     Labs -   No results found for this or any previous visit (from the past 12 hour(s)). Radiologic Studies -   @lastxrresult@  CT Results  (Last 48 hours)    None        CXR Results  (Last 48 hours)    None            Medical Decision Making   - I am the first provider for this patient. - I reviewed the vital signs, available nursing notes, past medical history, past surgical history, family history and social history. - Initial assessment performed. The patients presenting problems have been discussed, and they are in agreement with the care plan formulated and outlined with them. I have encouraged them to ask questions as they arise throughout their visit. Vital Signs-Reviewed the patient's vital signs. Patient Vitals for the past 12 hrs:   Temp Pulse Resp BP SpO2   02/13/21 1213 98.3 °F (36.8 °C) 96 18 (!) 150/111 100 %       Records Reviewed: Nursing Notes and Old Medical Records    The patient presents with urinary urgency dysuria; pruritic skin rash with a differential diagnosis of UTI hyperglycemia diuretic ; skin rash nonspecific fungal bacterial irritant      ED Course:          Provider Notes (Medical Decision Making):   69-year-old male presents with urgency dysuria urine shows no evidence of infection however glucose is 553 combination of diuretic and hyperglycemia precipitating persistent fluid loss patient admits he has been out of his insulin now for over 2 months. Treated with IV fluids and IV regular insulin glucose now 330; rash is clearly fungal by exam will treat with terbinafine  MDM       Procedures   Medical Decision Makingedical Decision Making  Performed by: Hermelindo Cristina MD  PROCEDURES:  Procedures       Disposition   Disposition: Condition stable and improved  DC- Adult Discharges: All of the diagnostic tests were reviewed and questions answered. Diagnosis, care plan and treatment options were discussed. The patient understands the instructions and will follow up as directed. The patients results have been reviewed with them.   They have been counseled regarding their diagnosis. The patient verbally convey understanding and agreement of the signs, symptoms, diagnosis, treatment and prognosis and additionally agrees to follow up as recommended with their PCP in 24 - 48 hours. They also agree with the care-plan and convey that all of their questions have been answered. I have also put together some discharge instructions for them that include: 1) educational information regarding their diagnosis, 2) how to care for their diagnosis at home, as well a 3) list of reasons why they would want to return to the ED prior to their follow-up appointment, should their condition change. DISCHARGE PLAN:  1. Current Discharge Medication List      CONTINUE these medications which have NOT CHANGED    Details   !! insulin NPH/insulin regular (HumuLIN 70/30 U-100 Insulin) 100 unit/mL (70-30) injection 49 Units by SubCUTAneous route Every morning. !! insulin NPH/insulin regular (HumuLIN 70/30 U-100 Insulin) 100 unit/mL (70-30) injection 45 Units by SubCUTAneous route every evening. furosemide (LASIX) 40 mg tablet Take 40 mg by mouth two (2) times a day. potassium chloride (K-DUR, KLOR-CON) 20 mEq tablet Take  by mouth two (2) times a day. bacitracin (BACITRACIN) 500 unit/gram oint Apply  to affected area three (3) times daily for 10 days. Apply to affected area  Qty: 1 Tube, Refills: 0       !! - Potential duplicate medications found. Please discuss with provider. 2.   Follow-up Information    None       3. Return to ED if worse   4. Current Discharge Medication List            Diagnosis     Clinical Impression: Hyperglycemia; tinea cruris  Attestations:    Roxie Blanco MD    Please note that this dictation was completed with [employer], the [employer] voice recognition software. Quite often unanticipated grammatical, syntax, homophones, and other interpretive errors are inadvertently transcribed by the computer software.   Please disregard these errors. Please excuse any errors that have escaped final proofreading. Thank you.

## 2021-02-13 NOTE — ED NOTES
Per patient \"I am homeless and have no where to go. My family is in [place] and I do not have their contact info, emergency contact is sister Elbert Santo and she lives in [place]. Pt is waiting on disability check that was due 2/3/2021 but has not received yet. Attempting to contact  at [employer]. Per  cab voucher can be given for ride to [facility] and they can assist with hotel placement for tonight while he is trying to contact his family and .

## 2021-03-14 ENCOUNTER — HOSPITAL ENCOUNTER (EMERGENCY)
Age: 61
Discharge: HOME | End: 2021-03-14
Attending: EMERGENCY MEDICINE
Payer: MEDICARE

## 2021-03-14 ENCOUNTER — APPOINTMENT (OUTPATIENT)
Dept: GENERAL RADIOLOGY | Age: 61
End: 2021-03-14
Attending: EMERGENCY MEDICINE
Payer: MEDICARE

## 2021-03-14 VITALS
TEMPERATURE: 97.5 F | DIASTOLIC BLOOD PRESSURE: 71 MMHG | WEIGHT: 260 LBS | HEART RATE: 78 BPM | BODY MASS INDEX: 41.78 KG/M2 | SYSTOLIC BLOOD PRESSURE: 115 MMHG | RESPIRATION RATE: 18 BRPM | HEIGHT: 66 IN | OXYGEN SATURATION: 98 %

## 2021-03-14 DIAGNOSIS — U07.1 COVID-19: Primary | ICD-10-CM

## 2021-03-14 PROCEDURE — 99282 EMERGENCY DEPT VISIT SF MDM: CPT

## 2021-03-14 PROCEDURE — 71045 X-RAY EXAM CHEST 1 VIEW: CPT

## 2021-03-14 NOTE — ED NOTES
Pt given discharge and follow up instructions. Pt advised to follow with PCP and to return to Ed if symptoms worsen. Pt has no further questions at this time.

## 2021-03-14 NOTE — ED TRIAGE NOTES
Pt reports cold like symptoms that began two weeks ago. Pt complains of runny nose, congestion, sore throat, and general weakness. NAD noted. Pt states he had covid test last Friday that was positive. Pt states he just wants a check up.

## 2021-03-14 NOTE — ED PROVIDER NOTES
EMERGENCY DEPARTMENT HISTORY AND PHYSICAL EXAM      Date: 3/14/2021  Patient Name: Umang Young    History of Presenting Illness     Chief Complaint   Patient presents with    Sore Throat    Nasal Congestion       History Provided By: Patient    HPI: Umang Young, 63 y.o. male with a past medical history significant DM, HTN, chronic renal dieseade and recenlty test positve for COVID. Patient states the NP called from the office before he got to ED stating he is COVID-19  presents to the ED with cc of sore throat and nasal congestion for 2 weeks. No fevers or chills. Good appetite and po fluid intake. There are no other complaints, changes, or physical findings at this time. PCP: Donell Virk PA-C    No current facility-administered medications on file prior to encounter. Current Outpatient Medications on File Prior to Encounter   Medication Sig Dispense Refill    terbinafine HCL (LAMISIL) 250 mg tablet Take 1 Tab by mouth daily. 15 Tab 1    insulin NPH/insulin regular (HumuLIN 70/30 U-100 Insulin) 100 unit/mL (70-30) injection 49 Units by SubCUTAneous route Every morning.  insulin NPH/insulin regular (HumuLIN 70/30 U-100 Insulin) 100 unit/mL (70-30) injection 45 Units by SubCUTAneous route every evening.  furosemide (LASIX) 40 mg tablet Take 40 mg by mouth two (2) times a day.  potassium chloride (K-DUR, KLOR-CON) 20 mEq tablet Take  by mouth two (2) times a day. Past History     Past Medical History:  Past Medical History:   Diagnosis Date    Chronic kidney disease     [address] [address]     Hypertension        Past Surgical History:  Past Surgical History:   Procedure Laterality Date    HX HIP REPLACEMENT      HX ORTHOPAEDIC         Family History:  Family History   Family history unknown: Yes       Social History:  Social History     Tobacco Use    Smoking status: Never Smoker    Smokeless tobacco: Never Used   Substance Use Topics    Alcohol use:  Yes Comment: Occasionally    Drug use: Not Currently       Allergies:  No Known Allergies      Review of Systems   Review of Systems   Constitutional: Negative. HENT: Positive for congestion. Nasal congestion   Eyes: Negative. Respiratory: Negative. Cardiovascular: Negative. Gastrointestinal: Negative. Endocrine: Negative. Genitourinary: Negative. Musculoskeletal: Negative. Skin: Negative. Allergic/Immunologic: Negative. Neurological: Negative. Hematological: Negative. Psychiatric/Behavioral: Negative. All other systems reviewed and are negative. Physical Exam   Physical Exam  Vitals signs and nursing note reviewed. Constitutional:       Appearance: Normal appearance. HENT:      Head: Normocephalic. Nose: Nose normal.      Mouth/Throat:      Mouth: Mucous membranes are moist.   Eyes:      Pupils: Pupils are equal, round, and reactive to light. Neck:      Musculoskeletal: Normal range of motion. Cardiovascular:      Rate and Rhythm: Normal rate. Pulmonary:      Effort: Pulmonary effort is normal.   Abdominal:      General: Abdomen is flat. Musculoskeletal: Normal range of motion. General: Tenderness present. Comments: Tenderness on the right side   Skin:     General: Skin is warm. Neurological:      General: No focal deficit present. Mental Status: He is alert and oriented to person, place, and time. Psychiatric:         Mood and Affect: Mood normal.         Lab and Diagnostic Study Results     Labs -   No results found for this or any previous visit (from the past 12 hour(s)). Radiologic Studies -     CT Results  (Last 48 hours)    None        CXR Results  (Last 48 hours)    None            Medical Decision Making   - I am the first provider for this patient. - I reviewed the vital signs, available nursing notes, past medical history, past surgical history, family history and social history.     - Initial assessment performed. The patients presenting problems have been discussed, and they are in agreement with the care plan formulated and outlined with them. I have encouraged them to ask questions as they arise throughout their visit. Vital Signs-Reviewed the patient's vital signs. Patient Vitals for the past 12 hrs:   Temp Pulse Resp BP SpO2   03/14/21 1421 97.5 °F (36.4 °C) 78 18 115/71 96 %       Records Reviewed: Nursing Notes    The patient presents with nasal congestion  And sore throat with a differential diagnosis of sinusitis, COVID-19 , bronchitis    ED Course:     ED Course as of Mar 14 1500   Sun Mar 14, 2021   5158 I reviewed the CXR and discussed the results with patient. [PG]      ED Course User Index  [PG] Pamela Young MD       Provider Notes (Medical Decision Making): MDM       Procedures   Medical Decision Makingedical Decision Making  Performed by: Vianey Bucio MD  PROCEDURES:Procedures       Disposition   Disposition: DC- Adult Discharges: All of the diagnostic tests were reviewed and questions answered. Diagnosis, care plan and treatment options were discussed. The patient understands the instructions and will follow up as directed. The patients results have been reviewed with them. They have been counseled regarding their diagnosis. The patient verbally convey understanding and agreement of the signs, symptoms, diagnosis, treatment and prognosis and additionally agrees to follow up as recommended with their PCP in 24 - 48 hours. They also agree with the care-plan and convey that all of their questions have been answered. I have also put together some discharge instructions for them that include: 1) educational information regarding their diagnosis, 2) how to care for their diagnosis at home, as well a 3) list of reasons why they would want to return to the ED prior to their follow-up appointment, should their condition change. DISCHARGE PLAN:  1.    Current Discharge Medication List      CONTINUE these medications which have NOT CHANGED    Details   terbinafine HCL (LAMISIL) 250 mg tablet Take 1 Tab by mouth daily. Qty: 15 Tab, Refills: 1      !! insulin NPH/insulin regular (HumuLIN 70/30 U-100 Insulin) 100 unit/mL (70-30) injection 49 Units by SubCUTAneous route Every morning. !! insulin NPH/insulin regular (HumuLIN 70/30 U-100 Insulin) 100 unit/mL (70-30) injection 45 Units by SubCUTAneous route every evening. furosemide (LASIX) 40 mg tablet Take 40 mg by mouth two (2) times a day. potassium chloride (K-DUR, KLOR-CON) 20 mEq tablet Take  by mouth two (2) times a day. !! - Potential duplicate medications found. Please discuss with provider. 2.   Follow-up Information    None       3. Return to ED if worse   4. Current Discharge Medication List            Diagnosis     Clinical Impression:1. Covid-19 positiv  Peter Plaza MD    Please note that this dictation was completed with [employer], the computer voice recognition software. Quite often unanticipated grammatical, syntax, homophones, and other interpretive errors are inadvertently transcribed by the computer software. Please disregard these errors. Please excuse any errors that have escaped final proofreading. Thank you.

## 2021-04-30 ENCOUNTER — HOSPITAL ENCOUNTER (EMERGENCY)
Age: 61
Discharge: HOME | End: 2021-04-30
Attending: EMERGENCY MEDICINE
Payer: MEDICARE

## 2021-04-30 ENCOUNTER — APPOINTMENT (OUTPATIENT)
Dept: CT IMAGING | Age: 61
End: 2021-04-30
Attending: EMERGENCY MEDICINE
Payer: MEDICARE

## 2021-04-30 VITALS
BODY MASS INDEX: 40.16 KG/M2 | RESPIRATION RATE: 18 BRPM | WEIGHT: 265 LBS | TEMPERATURE: 98.1 F | HEART RATE: 83 BPM | SYSTOLIC BLOOD PRESSURE: 135 MMHG | OXYGEN SATURATION: 98 % | HEIGHT: 68 IN | DIASTOLIC BLOOD PRESSURE: 68 MMHG

## 2021-04-30 DIAGNOSIS — R53.1 PARESTHESIAS WITH SUBJECTIVE WEAKNESS: Primary | ICD-10-CM

## 2021-04-30 DIAGNOSIS — R20.2 PARESTHESIAS WITH SUBJECTIVE WEAKNESS: Primary | ICD-10-CM

## 2021-04-30 LAB
ALBUMIN SERPL-MCNC: 3.6 G/DL (ref 3.5–5)
ALBUMIN/GLOB SERPL: 0.9 {RATIO} (ref 1.1–2.2)
ALP SERPL-CCNC: 101 U/L (ref 45–117)
ALT SERPL-CCNC: 23 U/L (ref 12–78)
ANION GAP SERPL CALC-SCNC: 9 MMOL/L (ref 5–15)
APPEARANCE UR: CLEAR
AST SERPL W P-5'-P-CCNC: 24 U/L (ref 15–37)
BASOPHILS # BLD: 0 K/UL (ref 0–0.2)
BASOPHILS NFR BLD: 1 % (ref 0–2.5)
BILIRUB SERPL-MCNC: 0.5 MG/DL (ref 0.2–1)
BILIRUB UR QL: NEGATIVE
BUN SERPL-MCNC: 19 MG/DL (ref 6–20)
BUN/CREAT SERPL: 12 (ref 12–20)
CA-I BLD-MCNC: 8.4 MG/DL (ref 8.5–10.1)
CHLORIDE SERPL-SCNC: 104 MMOL/L (ref 97–108)
CO2 SERPL-SCNC: 27 MMOL/L (ref 21–32)
COLOR UR: NORMAL
CREAT SERPL-MCNC: 1.61 MG/DL (ref 0.7–1.3)
EOSINOPHIL # BLD: 0.1 K/UL (ref 0–0.7)
EOSINOPHIL NFR BLD: 1 % (ref 0.9–2.9)
ERYTHROCYTE [DISTWIDTH] IN BLOOD BY AUTOMATED COUNT: 13.6 % (ref 11.5–14.5)
GLOBULIN SER CALC-MCNC: 4.2 G/DL (ref 2–4)
GLUCOSE SERPL-MCNC: 147 MG/DL (ref 65–100)
GLUCOSE UR STRIP.AUTO-MCNC: NEGATIVE MG/DL
HCT VFR BLD AUTO: 44.4 % (ref 41–53)
HGB BLD-MCNC: 14.8 G/DL (ref 13.5–17.5)
HGB UR QL STRIP: NEGATIVE
KETONES UR QL STRIP.AUTO: NEGATIVE MG/DL
LEUKOCYTE ESTERASE UR QL STRIP.AUTO: NEGATIVE
LYMPHOCYTES # BLD: 2 K/UL (ref 1–4.8)
LYMPHOCYTES NFR BLD: 31 % (ref 20.5–51.1)
MAGNESIUM SERPL-MCNC: 2.2 MG/DL (ref 1.6–2.4)
MCH RBC QN AUTO: 29.7 PG (ref 31–34)
MCHC RBC AUTO-ENTMCNC: 33.4 G/DL (ref 31–36)
MCV RBC AUTO: 89.1 FL (ref 80–100)
MONOCYTES # BLD: 0.6 K/UL (ref 0.2–2.4)
MONOCYTES NFR BLD: 9 % (ref 1.7–9.3)
NEUTS SEG # BLD: 3.7 K/UL (ref 1.8–7.7)
NEUTS SEG NFR BLD: 58 % (ref 42–75)
NITRITE UR QL STRIP.AUTO: NEGATIVE
NRBC # BLD: 0.01 K/UL
NRBC BLD-RTO: 0.1 PER 100 WBC
PH UR STRIP: 7 [PH] (ref 5–8)
PLATELET # BLD AUTO: 210 K/UL (ref 150–400)
PMV BLD AUTO: 8.7 FL (ref 6.5–11.5)
POTASSIUM SERPL-SCNC: 4.2 MMOL/L (ref 3.5–5.1)
PROT SERPL-MCNC: 7.8 G/DL (ref 6.4–8.2)
PROT UR STRIP-MCNC: NEGATIVE MG/DL
RBC # BLD AUTO: 4.98 M/UL (ref 4.5–5.9)
SODIUM SERPL-SCNC: 140 MMOL/L (ref 136–145)
SP GR UR REFRACTOMETRY: 1.01 (ref 1–1.03)
UROBILINOGEN UR QL STRIP.AUTO: 1 EU/DL (ref 0.2–1)
WBC # BLD AUTO: 6.4 K/UL (ref 4.4–11.3)

## 2021-04-30 PROCEDURE — 81003 URINALYSIS AUTO W/O SCOPE: CPT

## 2021-04-30 PROCEDURE — 80053 COMPREHEN METABOLIC PANEL: CPT

## 2021-04-30 PROCEDURE — 99283 EMERGENCY DEPT VISIT LOW MDM: CPT

## 2021-04-30 PROCEDURE — 85025 COMPLETE CBC W/AUTO DIFF WBC: CPT

## 2021-04-30 PROCEDURE — 70450 CT HEAD/BRAIN W/O DYE: CPT

## 2021-04-30 PROCEDURE — 36415 COLL VENOUS BLD VENIPUNCTURE: CPT

## 2021-04-30 PROCEDURE — 83735 ASSAY OF MAGNESIUM: CPT

## 2021-04-30 NOTE — ED NOTES
Pt speaking in clear full sentences, with equal bilateral upper and lower extremity strength. No bruising noted from pt reported fall yesterday. Pt given call light and told to call for any assistance if needed. Pt unable to provide urine specimen at this time, urine specimen cup was left with pt for when he is able to provide sample.

## 2021-04-30 NOTE — ED TRIAGE NOTES
Pt arrived to ED via POV with CO bilateral lower extremity numbness, stiffness, tingling, and weakness onset yesterday. Pt reports he fell yesterday due to weakness and now has 10/10 bilateral lower extremity pain. Pt ambulated to room independently w/o difficulty.

## 2021-04-30 NOTE — ED PROVIDER NOTES
EMERGENCY DEPARTMENT HISTORY AND PHYSICAL EXAM      Date: 4/30/2021  Patient Name: Juan Clayton    History of Presenting Illness     Chief Complaint   Patient presents with    Extremity Weakness       History Provided By: Patient    HPI: Juan Clayton, 57 y.o. male with a past medical history significant diabetes presents to the ED with cc of bilateral lower extremity numbness weakness tingling yesterday that caused patient to fall forward onto his knees denies any significant knee pain there was no LOC patient denies any headache; patient also noted yesterday he had numbness and tingling in his fingers but denies any upper extremity weakness    There are no other complaints, changes, or physical findings at this time. PCP: Loreta Abdi PA-C    No current facility-administered medications on file prior to encounter. Current Outpatient Medications on File Prior to Encounter   Medication Sig Dispense Refill    terbinafine HCL (LAMISIL) 250 mg tablet Take 1 Tab by mouth daily. 15 Tab 1    insulin NPH/insulin regular (HumuLIN 70/30 U-100 Insulin) 100 unit/mL (70-30) injection 49 Units by SubCUTAneous route Every morning.  insulin NPH/insulin regular (HumuLIN 70/30 U-100 Insulin) 100 unit/mL (70-30) injection 45 Units by SubCUTAneous route every evening.  furosemide (LASIX) 40 mg tablet Take 40 mg by mouth two (2) times a day.  potassium chloride (K-DUR, KLOR-CON) 20 mEq tablet Take  by mouth two (2) times a day.          Past History     Past Medical History:  Past Medical History:   Diagnosis Date    Chronic kidney disease     Diabetes ([address]     Hypertension        Past Surgical History:  Past Surgical History:   Procedure Laterality Date    HX HIP REPLACEMENT      HX ORTHOPAEDIC         Family History:  Family History   Family history unknown: Yes       Social History:  Social History     Tobacco Use    Smoking status: Never Smoker    Smokeless tobacco: Never Used   Substance Use Topics    Alcohol use: Yes     Comment: Occasionally    Drug use: Not Currently       Allergies:  No Known Allergies      Review of Systems     Review of Systems   Constitutional: Negative for chills and fever. HENT: Negative for rhinorrhea and sore throat. Eyes: Negative for pain and visual disturbance. Respiratory: Negative for cough and shortness of breath. Cardiovascular: Negative for chest pain and leg swelling. Gastrointestinal: Negative for abdominal pain and vomiting. Endocrine: Negative for polydipsia and polyuria. Genitourinary: Negative for dysuria and urgency. Musculoskeletal: Negative for back pain, gait problem, myalgias, neck pain and neck stiffness. Skin: Negative for color change and pallor. Neurological: Negative for weakness and numbness. Psychiatric/Behavioral: Negative. Physical Exam     Physical Exam  Vitals signs and nursing note reviewed. Constitutional:       Appearance: Normal appearance. HENT:      Head: Normocephalic and atraumatic. Mouth/Throat:      Mouth: Mucous membranes are moist.      Pharynx: Oropharynx is clear. Eyes:      Extraocular Movements: Extraocular movements intact. Conjunctiva/sclera: Conjunctivae normal.      Pupils: Pupils are equal, round, and reactive to light. Neck:      Musculoskeletal: Normal range of motion and neck supple. Cardiovascular:      Rate and Rhythm: Normal rate and regular rhythm. Pulses: Normal pulses. Dorsalis pedis pulses are 2+ on the right side and 2+ on the left side. Posterior tibial pulses are 2+ on the right side and 2+ on the left side. Heart sounds: Normal heart sounds. Pulmonary:      Effort: Pulmonary effort is normal.      Breath sounds: Normal breath sounds. Abdominal:      General: Bowel sounds are normal.      Palpations: Abdomen is soft. Musculoskeletal: Normal range of motion.          General: No swelling, tenderness, deformity or signs of injury. Skin:     General: Skin is warm and dry. Capillary Refill: Capillary refill takes less than 2 seconds. Neurological:      General: No focal deficit present. Mental Status: He is alert and oriented to person, place, and time. Cranial Nerves: Cranial nerves are intact. Sensory: Sensation is intact. Motor: Motor function is intact. No weakness or atrophy. Coordination: Coordination is intact. Romberg sign negative. Gait: Gait is intact. Gait and tandem walk normal.   Psychiatric:         Mood and Affect: Mood normal.         Behavior: Behavior normal.         Lab and Diagnostic Study Results     Labs -   No results found for this or any previous visit (from the past 12 hour(s)). Radiologic Studies -   @lastxrresult@  CT Results  (Last 48 hours)    None        CXR Results  (Last 48 hours)    None            Medical Decision Making   - I am the first provider for this patient. - I reviewed the vital signs, available nursing notes, past medical history, past surgical history, family history and social history. - Initial assessment performed. The patients presenting problems have been discussed, and they are in agreement with the care plan formulated and outlined with them. I have encouraged them to ask questions as they arise throughout their visit. Vital Signs-Reviewed the patient's vital signs. Patient Vitals for the past 12 hrs:   Temp Pulse Resp BP SpO2   04/30/21 1010 98.1 °F (36.7 °C) 85 18 130/64 99 %       Records Reviewed: Nursing Notes    The patient presents with lower extremity weakness with a differential diagnosis of neuropathy, lumbar spine mass, arterial sclerosis      ED Course:          Provider Notes (Medical Decision Making): MDM       Procedures   Medical Decision Makingedical Decision Making  Performed by:  Cyrus Haq MD  PROCEDURES:  Procedures       Disposition   Disposition: Condition unchanged and stable  DC- Adult Discharges: All of the diagnostic tests were reviewed and questions answered. Diagnosis, care plan and treatment options were discussed. The patient understands the instructions and will follow up as directed. The patients results have been reviewed with them. They have been counseled regarding their diagnosis. The patient verbally convey understanding and agreement of the signs, symptoms, diagnosis, treatment and prognosis and additionally agrees to follow up as recommended with their PCP in 24 - 48 hours. They also agree with the care-plan and convey that all of their questions have been answered. I have also put together some discharge instructions for them that include: 1) educational information regarding their diagnosis, 2) how to care for their diagnosis at home, as well a 3) list of reasons why they would want to return to the ED prior to their follow-up appointment, should their condition change. DISCHARGE PLAN:  1. Current Discharge Medication List      CONTINUE these medications which have NOT CHANGED    Details   terbinafine HCL (LAMISIL) 250 mg tablet Take 1 Tab by mouth daily. Qty: 15 Tab, Refills: 1      !! insulin NPH/insulin regular (HumuLIN 70/30 U-100 Insulin) 100 unit/mL (70-30) injection 49 Units by SubCUTAneous route Every morning. !! insulin NPH/insulin regular (HumuLIN 70/30 U-100 Insulin) 100 unit/mL (70-30) injection 45 Units by SubCUTAneous route every evening. furosemide (LASIX) 40 mg tablet Take 40 mg by mouth two (2) times a day. potassium chloride (K-DUR, KLOR-CON) 20 mEq tablet Take  by mouth two (2) times a day. !! - Potential duplicate medications found. Please discuss with provider. 2.   Follow-up Information    None       3. Return to ED if worse   4. Current Discharge Medication List            Diagnosis     Clinical Impression: No diagnosis found.     Attestations:    Lucio Lazaro MD    Please note that this dictation was completed with Dragon, the computer voice recognition software. Quite often unanticipated grammatical, syntax, homophones, and other interpretive errors are inadvertently transcribed by the computer software. Please disregard these errors. Please excuse any errors that have escaped final proofreading. Thank you.

## 2021-06-19 ENCOUNTER — HOSPITAL ENCOUNTER (EMERGENCY)
Age: 61
Discharge: HOME | End: 2021-06-19
Attending: EMERGENCY MEDICINE
Payer: MEDICARE

## 2021-06-19 VITALS
TEMPERATURE: 98.5 F | HEIGHT: 68 IN | WEIGHT: 265 LBS | SYSTOLIC BLOOD PRESSURE: 111 MMHG | RESPIRATION RATE: 20 BRPM | BODY MASS INDEX: 40.16 KG/M2 | HEART RATE: 99 BPM | OXYGEN SATURATION: 99 % | DIASTOLIC BLOOD PRESSURE: 79 MMHG

## 2021-06-19 DIAGNOSIS — M54.42 MIDLINE LOW BACK PAIN WITH LEFT-SIDED SCIATICA, UNSPECIFIED CHRONICITY: Primary | ICD-10-CM

## 2021-06-19 PROCEDURE — 74011250636 HC RX REV CODE- 250/636: Performed by: EMERGENCY MEDICINE

## 2021-06-19 PROCEDURE — 99282 EMERGENCY DEPT VISIT SF MDM: CPT

## 2021-06-19 PROCEDURE — 96372 THER/PROPH/DIAG INJ SC/IM: CPT

## 2021-06-19 RX ORDER — KETOROLAC TROMETHAMINE 30 MG/ML
60 INJECTION, SOLUTION INTRAMUSCULAR; INTRAVENOUS
Status: COMPLETED | OUTPATIENT
Start: 2021-06-19 | End: 2021-06-19

## 2021-06-19 RX ORDER — PREDNISONE 20 MG/1
40 TABLET ORAL DAILY
Qty: 20 TABLET | Refills: 0 | Status: SHIPPED | OUTPATIENT
Start: 2021-06-19 | End: 2021-06-29

## 2021-06-19 RX ORDER — IBUPROFEN 800 MG/1
800 TABLET ORAL
Qty: 20 TABLET | Refills: 0 | Status: SHIPPED | OUTPATIENT
Start: 2021-06-19 | End: 2021-06-26

## 2021-06-19 RX ADMIN — KETOROLAC TROMETHAMINE 60 MG: 30 INJECTION, SOLUTION INTRAMUSCULAR at 14:46

## 2021-06-19 RX ADMIN — METHYLPREDNISOLONE SODIUM SUCCINATE 125 MG: 125 INJECTION, POWDER, FOR SOLUTION INTRAMUSCULAR; INTRAVENOUS at 14:46

## 2021-06-19 NOTE — ED PROVIDER NOTES
EMERGENCY DEPARTMENT HISTORY AND PHYSICAL EXAM      Date: (Not on file)  Patient Name: Dylon Cali    History of Presenting Illness     Chief Complaint   Patient presents with    Hip Pain     pt presents with hip pain to left hip radiating down leg on going for 2 weeks, pt has pmh of chronic pain of same.  Back Pain     ongoing for 2-3 weeks lower back pain, pt denies injury       History Provided By: Patient    HPI: Dylon Cali, 57 y.o. male with a past medical history significant diabetes, hypertension and renal insufficiency presents to the ED with cc of patient complains of low back pain and left  hip pain  from a fall 3 weeks ago and was seen by his PMD where his gabapentin was increased from 100 mg 3 times daily to 300 mg 3 times daily; patient denies any new injuries; states the pain is worse at night    There are no other complaints, changes, or physical findings at this time. PCP: Marleny Chilel PA-C    No current facility-administered medications on file prior to encounter. Current Outpatient Medications on File Prior to Encounter   Medication Sig Dispense Refill    terbinafine HCL (LAMISIL) 250 mg tablet Take 1 Tab by mouth daily. 15 Tab 1    insulin NPH/insulin regular (HumuLIN 70/30 U-100 Insulin) 100 unit/mL (70-30) injection 49 Units by SubCUTAneous route Every morning.  insulin NPH/insulin regular (HumuLIN 70/30 U-100 Insulin) 100 unit/mL (70-30) injection 45 Units by SubCUTAneous route every evening.  furosemide (LASIX) 40 mg tablet Take 40 mg by mouth two (2) times a day.  potassium chloride (K-DUR, KLOR-CON) 20 mEq tablet Take  by mouth two (2) times a day.          Past History     Past Medical History:  Past Medical History:   Diagnosis Date    Chronic kidney disease     Diabetes ([address]     Hypertension        Past Surgical History:  Past Surgical History:   Procedure Laterality Date    HX HIP REPLACEMENT      HX ORTHOPAEDIC         Family History:  Family History   Family history unknown: Yes       Social History:  Social History     Tobacco Use    Smoking status: Never Smoker    Smokeless tobacco: Never Used   Vaping Use    Vaping Use: Never used   Substance Use Topics    Alcohol use: Yes     Comment: Occasionally    Drug use: Not Currently       Allergies:  No Known Allergies      Review of Systems     Review of Systems   Constitutional: Negative for chills and fever. HENT: Negative for rhinorrhea and sore throat. Eyes: Negative for pain and visual disturbance. Respiratory: Negative for cough and shortness of breath. Cardiovascular: Negative for chest pain and leg swelling. Gastrointestinal: Negative for abdominal pain and nausea. Endocrine: Negative for polydipsia and polyuria. Genitourinary: Negative for dysuria and urgency. Musculoskeletal: Positive for arthralgias and myalgias. Skin: Negative for color change and pallor. Neurological: Negative for weakness and numbness. Psychiatric/Behavioral: Negative. Physical Exam     Physical Exam  Vitals and nursing note reviewed. Constitutional:       Appearance: He is obese. HENT:      Head: Normocephalic and atraumatic. Mouth/Throat:      Mouth: Mucous membranes are moist.      Pharynx: Oropharynx is clear. Eyes:      Conjunctiva/sclera: Conjunctivae normal.      Pupils: Pupils are equal, round, and reactive to light. Cardiovascular:      Rate and Rhythm: Normal rate and regular rhythm. Pulses: Normal pulses. Heart sounds: Normal heart sounds. Pulmonary:      Effort: Pulmonary effort is normal.      Breath sounds: Normal breath sounds. Abdominal:      General: Bowel sounds are normal.      Palpations: Abdomen is soft. Musculoskeletal:         General: Tenderness present. No swelling, deformity or signs of injury. Cervical back: Normal and normal range of motion. Thoracic back: Normal.      Lumbar back: Tenderness present.  No signs of trauma, spasms or bony tenderness. Back:         Legs:    Skin:     General: Skin is warm and dry. Capillary Refill: Capillary refill takes less than 2 seconds. Neurological:      General: No focal deficit present. Mental Status: He is alert and oriented to person, place, and time. Psychiatric:         Mood and Affect: Mood normal.         Behavior: Behavior normal.         Lab and Diagnostic Study Results     Labs -   No results found for this or any previous visit (from the past 12 hour(s)). Radiologic Studies -   @lastxrresult@  CT Results  (Last 48 hours)    None        CXR Results  (Last 48 hours)    None            Medical Decision Making   - I am the first provider for this patient. - I reviewed the vital signs, available nursing notes, past medical history, past surgical history, family history and social history. - Initial assessment performed. The patients presenting problems have been discussed, and they are in agreement with the care plan formulated and outlined with them. I have encouraged them to ask questions as they arise throughout their visit. Vital Signs-Reviewed the patient's vital signs. Patient Vitals for the past 12 hrs:   Temp Pulse Resp BP SpO2   06/19/21 1320 98.5 °F (36.9 °C) 99 20 111/79 99 %       Records Reviewed: Nursing Notes    The patient presents with back pain with a differential diagnosis of  lumbar strain, pneumonia and traumatic injury      ED Course:          Provider Notes (Medical Decision Making): MDM       Procedures   Medical Decision Makingedical Decision Making  Performed by: Rheta Cushing, MD  PROCEDURES:  Procedures       Disposition   Disposition: Condition stable and improved  DC- Adult Discharges: All of the diagnostic tests were reviewed and questions answered. Diagnosis, care plan and treatment options were discussed. The patient understands the instructions and will follow up as directed.  The patients results have been reviewed with them. They have been counseled regarding their diagnosis. The patient verbally convey understanding and agreement of the signs, symptoms, diagnosis, treatment and prognosis and additionally agrees to follow up as recommended with their PCP in 24 - 48 hours. They also agree with the care-plan and convey that all of their questions have been answered. I have also put together some discharge instructions for them that include: 1) educational information regarding their diagnosis, 2) how to care for their diagnosis at home, as well a 3) list of reasons why they would want to return to the ED prior to their follow-up appointment, should their condition change. DISCHARGE PLAN:  1. Current Discharge Medication List      CONTINUE these medications which have NOT CHANGED    Details   terbinafine HCL (LAMISIL) 250 mg tablet Take 1 Tab by mouth daily. Qty: 15 Tab, Refills: 1      !! insulin NPH/insulin regular (HumuLIN 70/30 U-100 Insulin) 100 unit/mL (70-30) injection 49 Units by SubCUTAneous route Every morning. !! insulin NPH/insulin regular (HumuLIN 70/30 U-100 Insulin) 100 unit/mL (70-30) injection 45 Units by SubCUTAneous route every evening. furosemide (LASIX) 40 mg tablet Take 40 mg by mouth two (2) times a day. potassium chloride (K-DUR, KLOR-CON) 20 mEq tablet Take  by mouth two (2) times a day. !! - Potential duplicate medications found. Please discuss with provider. 2.   Follow-up Information    None       3. Return to ED if worse   4. Current Discharge Medication List            Diagnosis     Clinical Impression: No diagnosis found. Attestations:    Sharon Cummings MD    Please note that this dictation was completed with [employer], the [employer] voice recognition software. Quite often unanticipated grammatical, syntax, homophones, and other interpretive errors are inadvertently transcribed by the computer software.   Please disregard these errors. Please excuse any errors that have escaped final proofreading. Thank you.

## 2021-06-19 NOTE — ED TRIAGE NOTES
.  Chief Complaint   Patient presents with    Hip Pain     pt presents with hip pain to left hip radiating down leg on going for 2 weeks, pt has pmh of chronic pain of same.      Back Pain     ongoing for 2-3 weeks lower back pain, pt denies injury

## 2021-09-26 ENCOUNTER — HOSPITAL ENCOUNTER (EMERGENCY)
Age: 61
Discharge: HOME | End: 2021-09-26
Attending: EMERGENCY MEDICINE
Payer: MEDICARE

## 2021-09-26 VITALS
HEIGHT: 68 IN | SYSTOLIC BLOOD PRESSURE: 104 MMHG | WEIGHT: 264 LBS | OXYGEN SATURATION: 97 % | TEMPERATURE: 98.3 F | BODY MASS INDEX: 40.01 KG/M2 | HEART RATE: 82 BPM | DIASTOLIC BLOOD PRESSURE: 88 MMHG | RESPIRATION RATE: 17 BRPM

## 2021-09-26 DIAGNOSIS — L02.211 CUTANEOUS ABSCESS OF ABDOMINAL WALL: Primary | ICD-10-CM

## 2021-09-26 PROCEDURE — 74011000250 HC RX REV CODE- 250: Performed by: EMERGENCY MEDICINE

## 2021-09-26 PROCEDURE — 74011250637 HC RX REV CODE- 250/637: Performed by: EMERGENCY MEDICINE

## 2021-09-26 PROCEDURE — 75810000289 HC I&D ABSCESS SIMP/COMP/MULT

## 2021-09-26 PROCEDURE — 99283 EMERGENCY DEPT VISIT LOW MDM: CPT

## 2021-09-26 RX ORDER — SULFAMETHOXAZOLE AND TRIMETHOPRIM 800; 160 MG/1; MG/1
1 TABLET ORAL 2 TIMES DAILY
Qty: 14 TABLET | Refills: 0 | Status: SHIPPED | OUTPATIENT
Start: 2021-09-26 | End: 2021-10-03

## 2021-09-26 RX ORDER — SULFAMETHOXAZOLE AND TRIMETHOPRIM 800; 160 MG/1; MG/1
1 TABLET ORAL
Status: COMPLETED | OUTPATIENT
Start: 2021-09-26 | End: 2021-09-26

## 2021-09-26 RX ORDER — SULFAMETHOXAZOLE AND TRIMETHOPRIM 800; 160 MG/1; MG/1
1 TABLET ORAL EVERY 12 HOURS
Status: DISCONTINUED | OUTPATIENT
Start: 2021-09-26 | End: 2021-09-26

## 2021-09-26 RX ORDER — LIDOCAINE HYDROCHLORIDE 10 MG/ML
5 INJECTION INFILTRATION; PERINEURAL ONCE
Status: COMPLETED | OUTPATIENT
Start: 2021-09-26 | End: 2021-09-26

## 2021-09-26 RX ADMIN — LIDOCAINE HYDROCHLORIDE 5 ML: 10 INJECTION, SOLUTION INFILTRATION; PERINEURAL at 15:49

## 2021-09-26 RX ADMIN — SULFAMETHOXAZOLE AND TRIMETHOPRIM 1 TABLET: 800; 160 TABLET ORAL at 16:03

## 2021-09-26 NOTE — ED PROVIDER NOTES
HPI 79-year-old male with diabetes hypertension chronic renal disease presents to the ED with soft tissue swelling in the right pubic area beginning 3 days ago gradually worsening. No prior history of cutaneous abscess in this area. Denies fever glucose is 250 this morning no other complaints    Past Medical History:   Diagnosis Date    Chronic kidney disease     Diabetes ([address]     Hypertension        Past Surgical History:   Procedure Laterality Date    HX HIP REPLACEMENT      HX ORTHOPAEDIC           Family History:   Family history unknown: Yes       Social History     Socioeconomic History    Marital status: SINGLE     Spouse name: Not on file    Number of children: Not on file    Years of education: Not on file    Highest education level: Not on file   Occupational History    Not on file   Tobacco Use    Smoking status: Never Smoker    Smokeless tobacco: Never Used   Vaping Use    Vaping Use: Never used   Substance and Sexual Activity    Alcohol use: Yes     Comment: Occasionally    Drug use: Not Currently    Sexual activity: Not Currently   Other Topics Concern    Not on file   Social History Narrative    Not on file     Social Determinants of Health     Financial Resource Strain:     Difficulty of Paying Living Expenses:    Food Insecurity:     Worried About Running Out of Food in the Last Year:     [address] in the Last Year:    Transportation Needs:     Lack of Transportation (Medical):      Lack of Transportation (Non-Medical):    Physical Activity:     Days of Exercise per Week:     Minutes of Exercise per Session:    Stress:     Feeling of Stress :    Social Connections:     Frequency of Communication with Friends and Family:     Frequency of Social Gatherings with Friends and Family:     Attends Shinto Services:     Active Member of Clubs or Organizations:     Attends Club or Organization Meetings:     Marital Status:    Intimate Partner Violence:     Fear of Current or Ex-Partner:     Emotionally Abused:     Physically Abused:     Sexually Abused: ALLERGIES: Patient has no known allergies. Review of Systems   All other systems reviewed and are negative. Vitals:    09/26/21 1519   BP: 104/88   Pulse: 82   Resp: 17   Temp: 98.3 °F (36.8 °C)   SpO2: 97%   Weight: 119.7 kg (264 lb)   Height: 5' 8\" (1.727 m)            Physical Exam  Vitals and nursing note reviewed. Constitutional:       General: He is not in acute distress. Appearance: Normal appearance. He is obese. He is not ill-appearing or toxic-appearing. Skin:     General: Skin is warm. Coloration: Skin is not ashen, jaundiced or pale. Findings: Erythema present. No abrasion, acne, burn, ecchymosis, petechiae or wound. Comments: Approximately 1 cm area of induration tender to palpation; minor overlying erythema appears to be a folliculitis with tiny abscess   Neurological:      Mental Status: He is alert.           MDM procedure note anesthesia with 1% lidocaine 1 cc incision and drainage with number 18-gauge needle several drops of blood pus mixed drained patient tolerated well       Procedures I&D of cutaneous abscess

## 2021-11-17 ENCOUNTER — HOSPITAL ENCOUNTER (OUTPATIENT)
Dept: PHYSICAL THERAPY | Age: 61
Discharge: HOME | End: 2021-11-17
Payer: MEDICARE

## 2021-11-17 PROCEDURE — 97161 PT EVAL LOW COMPLEX 20 MIN: CPT

## 2021-11-17 NOTE — PROGRESS NOTES
PT INITIAL EVALUATION NOTE 2-15    Patient Name: Pratima Rodriguez  Date:2021  : 1960  [x]  Patient  Verified  Payor: BL[employer] MEDICARE / Plan: [address] [place] [address] HMO / Product Type: Managed Care Medicare /    In time: 1:40 PM  Out time:2:10 PM  Visit #: 1     Treatment Area: Repeated falls [R29.6]    SUBJECTIVE  Pain Level (0-10 scale): 7/10  Any medication changes, allergies to medications, adverse drug reactions, diagnosis change, or new procedure performed?: [] No    [x] Yes (see summary sheet for update)     Subjective:  57year old black male who stated that his legs became unstable and he fell in  at the truck stop. He has been encountering  instability in LE and fallen a  number of times. Diabetes have not been controllable. Patient has received Physical Therapy at [address] [place] [address] previously for the same problem. Patient has a rolling walker at the shelter. PLOF: Independent with all ADL's; primary use of cane for mobility  Mechanism of Injury:  Fall  Previous Treatment/Compliance: medication, Left hip replacement x 2 ( 2016)  Radiographs: none   What increases symptoms: standing, walking  What decreases symptoms: sitting  Work Hx: Disabled  Living Situation: Lives in a shelter  Pt Goals: To be able to walk better.   Barriers: education   Motivation: Good  Substance use: None reported  Cognition: A&O x 4  Fall Assessment: TUG Test: 25 seconds  Past Medical History:  Past Medical History:   Diagnosis Date    Chronic kidney disease     [address] [address]     Hypertension      Past Surgical History:  Past Surgical History:   Procedure Laterality Date    HX HIP REPLACEMENT      HX ORTHOPAEDIC       Current Medications:  Current Outpatient Medications   Medication Instructions    furosemide (LASIX) 40 mg, Oral, 2 TIMES DAILY    insulin NPH/insulin regular (HumuLIN 70/30 U-100 Insulin) 100 unit/mL (70-30) injection 49 Units, SubCUTAneous, EVERY MORNING  insulin NPH/insulin regular (HumuLIN 70/30 U-100 Insulin) 100 unit/mL (70-30) injection 45 Units, SubCUTAneous, EVERY EVENING    potassium chloride (K-DUR, KLOR-CON) 20 mEq tablet Oral, 2 TIMES DAILY    terbinafine HCL (LAMISIL) 250 mg, Oral, DAILY          OBJECTIVE/EXAMINATION    Posture: Fair  Gait and Functional Mobility:  Gait with straight cane with foot ER and forward lumbar flexion  Observation - Shortness of breath and frequent rest period walking into the department   Fall Risk - High Fall Risk    Hip:  Strength       Right Left        Flexion 4/5 3/5        Extension 4/5 3/5        Abduction 4/5 3/5        Adduction 4/5 4/5                           Knee:  Strength       Right Left        Flexion 5/5 4/5        Extension 5/5 4/5       Ankle  Strength       Right Left        Dorsiflexion 5/5 5/5        Plantarflexion 5/5 5/5        Inversion 5/5 5/5        Eversion 5/5 5/5       *All strength measures are on a scale with 5 as a maximum, if a space is left blank it was not tested. All ROM measurements are measured in degrees.       Pain Level (0-10 scale) post treatment:7/10      ASSESSMENT:    [x]  See Plan of [facility], PT,  11/17/2021

## 2021-11-17 NOTE — PROGRESS NOTES
[address], [place]  Ph: [phone]    Fax: [phone]    Plan of Care/Statement of Necessity for Physical Therapy Services  2-15    Patient name: Hernesto Mcallister  : 1960  Provider#: [id number]  Referral source: Dank Camacho PA-C      Medical/Treatment Diagnosis: Repeated falls [R29.6]     Prior Hospitalization: see medical history     Comorbidities: see medical history  Prior Level of Function: Independent with all ADL's; primary use of cane for mobility  Medications: Verified on Patient Summary List  Start of Care: 2021      Onset Date:      The Plan of Care and following information is based on the information from the initial evaluation. Assessment/ key information: 57year old black male who resides in a shelter. Referred to Physical Therapy due to frequent falls, unstable gait and LE pain. Patient  has a history of uncontrollable diabetes and a left hip replacement. Patient presents bilateral weakness of both LE, high fall risk, decreased balance and coordination. Patient may benefit from Physical Therapy to increase strength of both LE, improve balance and coordination and safe ambulation to improve mobility. Patient will be given a home exercise program and demonstrate properly for compliance.        Evaluation Complexity History LOW Complexity : Zero comorbidities / personal factors that will impact the outcome / POC; Examination LOW Complexity : 1-2 Standardized tests and measures addressing body structure, function, activity limitation and / or participation in recreation  ;Presentation LOW Complexity : Stable, uncomplicated  ;Clinical Decision Making TUG Score: 25 seconds  Overall Complexity Rating: LOW     Problem List: pain affecting function, decrease ROM, decrease strength, impaired gait/ balance, decrease ADL/ functional abilitiies and decrease activity tolerance   Treatment Plan may include any combination of the following: Therapeutic exercise, Therapeutic activities, Neuromuscular re-education, Physical agent/modality, Gait/balance training, Patient education, Functional mobility training, Home safety training and Stair training  Patient / Family readiness to learn indicated by: asking questions  Persons(s) to be included in education: patient (P)  Barriers to Learning/Limitations: yes;  reading/writing and financial  Patient Goal (s): To be able to walk better. \"  Patient Self Reported Health Status: fair  Rehabilitation Potential: fair    Short Term Goals: To be accomplished in 5 treatments. Patient will be able to properly demonstrate exercises properly for LE x 20 reps. Patient will be able to ambulate safely with rollator for community distances over 500 feet to get to the car. Patient will be able to perform sit to stand independently from low chairs with 1 hand support. Patient will be able to perform TUG Test less than 20 seconds to decrease the risk of falls. Long Term Goals: To be accomplished in 10  treatments. Patient will be able to perform sit to stand independently from low chairs with 0 hand support. Patient will be able to climb up and down 4 steps x 3 min with 1 rail with LE strength of 4/5. Patient will be able to ambulate safely with assistive device for community distance for at 3 minutes to get on the scooter to shop at market. Patient will be able to perform TUG Test less than 15  seconds to decrease the risk of falls. Frequency / Duration: Patient to be seen 2 times per week for 10 treatments. Patient/ Caregiver education and instruction: exercises      Certification Period: 11/17/2021 to 2/15/22    Ramya Mccollum PT,  11/17/2021     ________________________________________________________________________    I certify that the above Therapy Services are being furnished while the patient is under my care.  I agree with the treatment plan and certify that this therapy is necessary.     [phone] Signature:____________________  Date:____________Time: _________    Patient name: Jessica Perez  : 1960  Provider#: [id number]

## 2021-11-19 ENCOUNTER — HOSPITAL ENCOUNTER (OUTPATIENT)
Dept: PHYSICAL THERAPY | Age: 61
Discharge: HOME | End: 2021-11-19
Payer: MEDICARE

## 2021-11-19 PROCEDURE — 97110 THERAPEUTIC EXERCISES: CPT

## 2021-11-19 NOTE — PROGRESS NOTES
PT DAILY TREATMENT NOTE 2-15    Patient Name: Doreen Leigh  Date:2021  : 1960  [x]  Patient  Verified  Payor: BLUE CROSS MEDICARE / Plan: [address] [facility] HMO / Product Type: Managed Care Medicare /    In time:802 am  Out time:900 am  Total Treatment Time (min): 62  Visit #:  2    Treatment Area: Repeated falls [R29.6]    SUBJECTIVE  Pain Level (0-10 scale): 6/10  Any medication changes, allergies to medications, adverse drug reactions, diagnosis change, or new procedure performed?: [x] No    [] Yes (see summary sheet for update)  Subjective functional status/changes:   [] No changes reported  \"Pt reports he has medicine list and it L hip that hurts the most.\"    OBJECTIVE      58 min Therapeutic Exercise:  [x] See flow sheet :   Rationale: increase ROM, increase strength and improve coordination to improve the patients ability to increase leg strength and improve balance. With   [] TE   [] TA   [] neuro   [] other: Patient Education: [x] Review HEP    [] Progressed/Changed HEP based on:   [] positioning   [] body mechanics   [] transfers   [] heat/ice application    [] other:         Pain Level (0-10 scale) post treatment: 5/10    ASSESSMENT/Changes in Function:   Patient tolerated treatment session with initiation of LE ex and instruction on HEP, hand out given. Pt needing frequent rest between tasks to regroup and relief mm soreness. Pt has limited motion and strength and will continue to work on both. Patient will continue to benefit from skilled PT services to modify and progress therapeutic interventions, address functional mobility deficits, address ROM deficits, address strength deficits and analyze and address soft tissue restrictions to attain remaining goals. [x]  See Plan of Care  []  See progress note/recertification  []  See Discharge Summary         Progress towards goals / Updated goals:  Short Term Goals:  To be accomplished in 5 treatments. Patient will be able to properly demonstrate exercises properly for LE x 20 reps. Patient will be able to ambulate safely with rollator for community distances over 500 feet to get to the car. Patient will be able to perform sit to stand independently from low chairs with 1 hand support. Patient will be able to perform TUG Test less than 20 seconds to decrease the risk of falls. Long Term Goals: To be accomplished in 10  treatments. Patient will be able to perform sit to stand independently from low chairs with 0 hand support. Patient will be able to climb up and down 4 steps x 3 min with 1 rail with LE strength of 4/5. Patient will be able to ambulate safely with assistive device for community distance for at 3 minutes to get on the scooter to shop at market. Patient will be able to perform TUG Test less than 15  seconds to decrease the risk of falls. *    PLAN  [x]  Upgrade activities as tolerated     [x]  Continue plan of care  []  Update interventions per flow sheet       []  Discharge due to:_  []  Other:_      Willard Olea PTA, LPTA 11/19/2021

## 2021-11-23 ENCOUNTER — HOSPITAL ENCOUNTER (OUTPATIENT)
Dept: PHYSICAL THERAPY | Age: 61
Discharge: HOME | End: 2021-11-23
Payer: MEDICARE

## 2021-11-23 PROCEDURE — 97150 GROUP THERAPEUTIC PROCEDURES: CPT

## 2021-11-23 PROCEDURE — 97110 THERAPEUTIC EXERCISES: CPT

## 2021-11-23 NOTE — PROGRESS NOTES
PT DAILY TREATMENT NOTE - [facility] 2-15    Patient Name: Olga Lidia Corral  Date:2021  : 1960  [x]  Patient  Verified  Payor: BLUE CROSS MEDICARE / Plan: [facility] HMO / Product Type: Managed Care Medicare /    In time: 9:27  Out time: 10:18  Total Treatment Time (min): 51  Total Timed Codes (min): 26  1:1 Treatment Time ( only): 26   Visit #:  3    Treatment Area: Repeated falls [R29.6]    SUBJECTIVE  Pain Level (0-10 scale): 6/10  Any medication changes, allergies to medications, adverse drug reactions, diagnosis change, or new procedure performed?: [x] No    [] Yes (see summary sheet for update)  Subjective functional status/changes:   [] No changes reported  Pt reports some pain in L hip    OBJECTIVE    26 min Therapeutic Exercise:  [x] See flow sheet :   Rationale: increase ROM and increase strength to improve the patients ability to improve functional mobility         25 min Group Therapy:  [x] See flow sheet :   Billed while completing TE with other pts throughout session    With   [] TE   [] TA   [] neuro   [] other: Patient Education: [x] Review HEP    [] Progressed/Changed HEP based on:   [] positioning   [] body mechanics   [] transfers   [] heat/ice application    [] other:      Pain Level (0-10 scale) post treatment: 6/10    ASSESSMENT/Changes in Function: The pt tolerated treatment fair. Cueing needed for hand placement with sit to stands. Frequent seated rest breaks during standing exercises due to fatigue. Patient will continue to benefit from skilled PT services to address functional mobility deficits, address ROM deficits and address strength deficits to attain remaining goals     [x]  See Plan of Care  []  See progress note/recertification  []  See Discharge Summary         Progress towards goals / Updated goals:  Short Term Goals: To be accomplished in 5 treatments. Patient will be able to properly demonstrate exercises properly for LE x 20 reps.   Patient will be able to ambulate safely with rollator for community distances over 500 feet to get to the car. Patient will be able to perform sit to stand independently from low chairs with 1 hand support. Patient will be able to perform TUG Test less than 20 seconds to decrease the risk of falls.     Long Term Goals: To be accomplished in 10  treatments. Patient will be able to perform sit to stand independently from low chairs with 0 hand support. Patient will be able to climb up and down 4 steps x 3 min with 1 rail with LE strength of 4/5. Patient will be able to ambulate safely with assistive device for community distance for at 3 minutes to get on the scooter to shop at market.   Patient will be able to perform TUG Test less than 15  seconds to decrease the risk of falls    PLAN  [x]  Upgrade activities as tolerated     [x]  Continue plan of care  []  Update interventions per flow sheet       []  Discharge due to:_  []  Other:_      Yazan Medellin, PTA, LPTA 11/23/2021

## 2021-11-30 ENCOUNTER — APPOINTMENT (OUTPATIENT)
Dept: PHYSICAL THERAPY | Age: 61
End: 2021-11-30
Payer: MEDICARE

## 2021-12-07 ENCOUNTER — HOSPITAL ENCOUNTER (OUTPATIENT)
Dept: PHYSICAL THERAPY | Age: 61
Discharge: HOME | End: 2021-12-07
Payer: MEDICARE

## 2021-12-07 PROCEDURE — 97110 THERAPEUTIC EXERCISES: CPT

## 2021-12-07 PROCEDURE — 97150 GROUP THERAPEUTIC PROCEDURES: CPT

## 2021-12-12 ENCOUNTER — HOSPITAL ENCOUNTER (EMERGENCY)
Age: 61
Discharge: HOME | End: 2021-12-12
Attending: EMERGENCY MEDICINE
Payer: MEDICARE

## 2021-12-12 VITALS
SYSTOLIC BLOOD PRESSURE: 141 MMHG | DIASTOLIC BLOOD PRESSURE: 96 MMHG | OXYGEN SATURATION: 99 % | HEART RATE: 96 BPM | RESPIRATION RATE: 18 BRPM | TEMPERATURE: 99 F

## 2021-12-12 DIAGNOSIS — R60.9 PERIPHERAL EDEMA: Primary | ICD-10-CM

## 2021-12-12 PROCEDURE — 99283 EMERGENCY DEPT VISIT LOW MDM: CPT

## 2021-12-12 PROCEDURE — 74011250637 HC RX REV CODE- 250/637: Performed by: EMERGENCY MEDICINE

## 2021-12-12 RX ORDER — FUROSEMIDE 40 MG/1
40 TABLET ORAL DAILY
Qty: 20 TABLET | Refills: 0 | Status: SHIPPED | OUTPATIENT
Start: 2021-12-12 | End: 2022-01-01

## 2021-12-12 RX ORDER — FUROSEMIDE 40 MG/1
40 TABLET ORAL
Status: COMPLETED | OUTPATIENT
Start: 2021-12-12 | End: 2021-12-12

## 2021-12-12 RX ADMIN — FUROSEMIDE 40 MG: 40 TABLET ORAL at 18:45

## 2021-12-12 NOTE — ED TRIAGE NOTES
Pt reports edema in hands and feet. PT reports he lost his \"fluid pills\". PT states he last took it Thursday night.  PT denies any SOB or chest pain at this time

## 2021-12-12 NOTE — ED PROVIDER NOTES
HPI   Patient reports that he misplaced his Lasix pill bottle several days ago and has been without it. He reports that he has had return of chronic bilateral lower extremity edema. He denies shortness of breath, orthopnea, changes in urine, pain of any kind, focal weakness, shortness of breath or dyspnea on exertion. Past Medical History:   Diagnosis Date    Chronic kidney disease     Diabetes ([address]     Hypertension        Past Surgical History:   Procedure Laterality Date    HX HIP REPLACEMENT      HX ORTHOPAEDIC           Family History:   Family history unknown: Yes       Social History     Socioeconomic History    Marital status: SINGLE     Spouse name: Not on file    Number of children: Not on file    Years of education: Not on file    Highest education level: Not on file   Occupational History    Not on file   Tobacco Use    Smoking status: Never Smoker    Smokeless tobacco: Never Used   Vaping Use    Vaping Use: Never used   Substance and Sexual Activity    Alcohol use: Yes     Comment: Occasionally    Drug use: Not Currently    Sexual activity: Not Currently   Other Topics Concern    Not on file   Social History Narrative    Not on file     Social Determinants of Health     Financial Resource Strain:     Difficulty of Paying Living Expenses: Not on file   Food Insecurity:     Worried About [address] in the Last Year: Not on file    [address] in the Last Year: Not on file   Transportation Needs:     Lack of Transportation (Medical): Not on file    Lack of Transportation (Non-Medical):  Not on file   Physical Activity:     Days of Exercise per Week: Not on file    Minutes of Exercise per Session: Not on file   Stress:     Feeling of Stress : Not on file   Social Connections:     Frequency of Communication with Friends and Family: Not on file    Frequency of Social Gatherings with Friends and Family: Not on file    Attends Buddhism Services: Not on file   Ardyth Moritz Active Member of Clubs or Organizations: Not on file    Attends Club or Organization Meetings: Not on file    Marital Status: Not on file   Intimate Partner Violence:     Fear of Current or Ex-Partner: Not on file    Emotionally Abused: Not on file    Physically Abused: Not on file    Sexually Abused: Not on file   Housing Stability:     Unable to Pay for Housing in the Last Year: Not on file    Number of [place] in the Last Year: Not on file    Unstable Housing in the Last Year: Not on file         ALLERGIES: Patient has no known allergies. Review of Systems   Constitutional: Negative. HENT: Negative. Eyes: Negative. Respiratory: Negative. Cardiovascular: Positive for leg swelling. Gastrointestinal: Negative. Endocrine: Negative. Genitourinary: Negative. Musculoskeletal: Negative. Allergic/Immunologic: Negative. Neurological: Negative. Hematological: Negative. Psychiatric/Behavioral: Negative. All other systems reviewed and are negative. Vitals:    12/12/21 1828   BP: (!) 141/96   Pulse: 96   Resp: 18   Temp: 99 °F (37.2 °C)   SpO2: 99%            Physical Exam  Vitals and nursing note reviewed. Constitutional:       General: He is not in acute distress. Appearance: Normal appearance. He is obese. He is not ill-appearing, toxic-appearing or diaphoretic. HENT:      Head: Normocephalic and atraumatic. Nose: Nose normal.      Mouth/Throat:      Mouth: Mucous membranes are moist.   Eyes:      Extraocular Movements: Extraocular movements intact. Pupils: Pupils are equal, round, and reactive to light. Cardiovascular:      Rate and Rhythm: Normal rate and regular rhythm. Pulses: Normal pulses. Heart sounds: Normal heart sounds. No murmur heard. No friction rub. No gallop. Comments: No JVD  Pulmonary:      Effort: Pulmonary effort is normal. No respiratory distress. Breath sounds: Normal breath sounds. No stridor.  No wheezing, rhonchi or rales. Chest:      Chest wall: No tenderness. Abdominal:      General: Abdomen is flat. There is no distension. Palpations: Abdomen is soft. There is no mass. Tenderness: There is no abdominal tenderness. There is no guarding. Hernia: No hernia is present. Musculoskeletal:         General: No swelling, tenderness, deformity or signs of injury. Normal range of motion. Cervical back: Normal range of motion and neck supple. No rigidity or tenderness. Right lower leg: Edema present. Left lower leg: Edema present. Comments: Mild nonpitting BLE edema   Lymphadenopathy:      Cervical: No cervical adenopathy. Skin:     General: Skin is warm and dry. Capillary Refill: Capillary refill takes less than 2 seconds. Coloration: Skin is not jaundiced or pale. Findings: No bruising, erythema or rash. Neurological:      General: No focal deficit present. Mental Status: He is alert and oriented to person, place, and time. Mental status is at baseline. Cranial Nerves: No cranial nerve deficit. Sensory: No sensory deficit. Motor: No weakness. Coordination: Coordination normal.      Gait: Gait normal.   Psychiatric:         Mood and Affect: Mood normal.         Behavior: Behavior normal.          MDM     Patient is essentially here for a prescription refill. There are no clinical signs of volume overload, pulmonary edema, acute vascular or bacterial process. Okay for refill of prescription and discharge.   Procedures

## 2021-12-14 ENCOUNTER — HOSPITAL ENCOUNTER (OUTPATIENT)
Dept: PHYSICAL THERAPY | Age: 61
Discharge: HOME | End: 2021-12-14
Payer: MEDICARE

## 2021-12-14 PROCEDURE — 97110 THERAPEUTIC EXERCISES: CPT

## 2021-12-14 NOTE — PROGRESS NOTES
[address]  [place], [place]  Ph: [phone]    Fax: [phone]    Progress Note    Name: Berneice Boeck   : 1960   MD: Garrett Bradley PA-C       Treatment Diagnosis: Repeated falls [R29.6]  Start of Care: 2021    Visits from Start of Care: 5   Missed Visits: 2    Summary of Care / Assessment / Recommendations:   Pt has demonstrated minimal progress since beginning his therapy. Compliance with his HEP and attendance have been limiting factors in his treatment. His TUG score actually declined but he did show some improvement in strength. Compliance with his HEP and attendance was discussed with the patient and he has agreed to make the necessary improvements. He will continue to benefit from skilled therapy services to address his remaining deficits which include decreased strength, impaired gait, and impaired balance, so that he can return to at or near his prior level of function. Progress Toward Goals:  Short Term Goals: To be accomplished in 5 treatments. Patient will be able to properly demonstrate exercises properly for LE x 20 reps - PROGRESSING  Patient will be able to ambulate safely with rollator for community distances over 500 feet to get to the car - MET   Patient will be able to perform sit to stand independently from low chairs with 1 hand support - PROGRESSING  Patient will be able to perform TUG Test less than 20 seconds to decrease the risk of falls - NOT MET     Long Term Goals: To be accomplished in 10 treatments.   Patient will be able to perform sit to stand independently from low chairs with 0 hand support - NOT MET  Patient will be able to climb up and down 4 steps x 3 min with 1 rail with LE strength of 4/5 - NOT MET   Patient will be able to ambulate safely with assistive device for community distance for at 3 minutes to get on the scooter to shop at [facility]  Patient will be able to perform TUG Test less than 15  seconds to decrease the risk of falls - NOT MET    Recertification Period: 11/17/2021 to 2/15/2022    Frequency/Duration:  2 treatments per week, for 10 treaments. Luly Kumar, PT, DPT 12/14/2021     ________________________________________________________________________  NOTE TO PHYSICIAN:  Please complete the following and fax to:  [facility]:   Fax: [phone]  . Retain this original for your records. If you are unable to process this request in 24 hours, please contact our office.        ____ I have read the above report and request that my patient continue therapy with the following changes/special instructions:  ____ I have read the above report and request that my patient be discharged from therapy    Physician's Signature:_________________ Date:___________Time:__________

## 2021-12-14 NOTE — PROGRESS NOTES
PT DAILY TREATMENT NOTE - [facility] 2-15    Patient Name: Kevon Baptiste  Date:2021  : 1960  [x]  Patient  Verified  Payor: BLUE CROSS MEDICARE / Plan: [facility]  HMO / Product Type: Managed Care Medicare /    In time: 9:26  Out time: 9:58  Total Treatment Time (min): 32  Total Timed Codes (min): 32  1:1 Treatment Time ( only): 32   Visit #:  5    Treatment Area: Repeated falls [R29.6]    SUBJECTIVE  Pain Level (0-10 scale): 5/10  Any medication changes, allergies to medications, adverse drug reactions, diagnosis change, or new procedure performed?: [x] No    [] Yes (see summary sheet for update)  Subjective functional status/changes:   [x] No changes reported    OBJECTIVE    32 min Therapeutic Exercise:  [x] See flow sheet :   Rationale: increase ROM and increase strength to improve the patients ability to improve functional mobility    With   [x] TE   [] TA   [] neuro   [] other: Patient Education: [x] Review HEP    [] Progressed/Changed HEP based on:   [] positioning   [] body mechanics   [] transfers   [] heat/ice application    [] other:      Other Objective/Functional Measures:    Hip:   Strength           Right Left             Flexion 4+/5 4/5             Extension 4/5 3/5             Abduction 4/5 3/5             Adduction 4/5 4/5                                               Knee:   Strength           Right Left             Flexion 4/5 4/5             Extension 5/5 4+/5         TUG Test: 27 seconds (with rollator)    Pain Level (0-10 scale) post treatment: 5/10    ASSESSMENT/Changes in Function: The pt tolerated treatment fairly well. Progress note completed today by physical therapist. All goals and measurements updated. Pt states he does not do exercises at home except walking - pt has been educated on the importance of continuing his HEP. Pt given another copy of HEP. Physical therapist consulted about pt's progress.  Has fallen 1x since starting therapy in his storage unit. Patient will continue to benefit from skilled PT services to address functional mobility deficits, address ROM deficits and address strength deficits to attain remaining goals     [x]  See Plan of Care  [x]  See progress note/recertification  []  See Discharge Summary         Progress towards goals / Updated goals:  Short Term Goals: To be accomplished in 5 treatments. Patient will be able to properly demonstrate exercises properly for LE x 20 reps - PROGRESSING  Patient will be able to ambulate safely with rollator for community distances over 500 feet to get to the car - MET   Patient will be able to perform sit to stand independently from low chairs with 1 hand support - PROGRESSING  Patient will be able to perform TUG Test less than 20 seconds to decrease the risk of falls - NOT MET     Long Term Goals: To be accomplished in 10 treatments.   Patient will be able to perform sit to stand independently from low chairs with 0 hand support - NOT MET  Patient will be able to climb up and down 4 steps x 3 min with 1 rail with LE strength of 4/5 - NOT MET   Patient will be able to ambulate safely with assistive device for community distance for at 3 minutes to get on the scooter to shop at [facility]  Patient will be able to perform TUG Test less than 15  seconds to decrease the risk of falls - NOT MET    PLAN  [x]  Upgrade activities as tolerated     [x]  Continue plan of care  []  Update interventions per flow sheet       []  Discharge due to:_  []  Other:_      Carlos Eduardo Garrido PTA, LPTA 12/14/2021

## 2021-12-28 ENCOUNTER — HOSPITAL ENCOUNTER (OUTPATIENT)
Dept: PHYSICAL THERAPY | Age: 61
Discharge: HOME | End: 2021-12-28
Payer: MEDICARE

## 2021-12-28 PROCEDURE — 97110 THERAPEUTIC EXERCISES: CPT

## 2021-12-28 NOTE — PROGRESS NOTES
PT DAILY TREATMENT NOTE - [facility] 2-15    Patient Name: Barbara Paula  Date:2021  : 1960  [x]  Patient  Verified  Payor: Aubrey Mena / Plan: [address]  HMO / Product Type: Managed Care Medicare /    In time:8:30 AM  Out time:9:30 AM  Total Treatment Time (min): 60  Total Timed Codes (min): 60  1:1 Treatment Time ( [address] only): 60   Visit #:  6    Treatment Area: Repeated falls [R29.6]    SUBJECTIVE  Pain Level (0-10 scale): 5/10  Any medication changes, allergies to medications, adverse drug reactions, diagnosis change, or new procedure performed?: [x] No    [] Yes (see summary sheet for update)    Subjective functional status/changes:   [] No changes reported    I trip over something in the store and fell. OBJECTIVE    45 min Therapeutic Exercise:  [x] See flow sheet :   Rationale: increase strength, improve coordination, improve balance and increase proprioception to improve the patients ability to gait safely and   climb up and down 4 steps x 3 min with 1 rail with LE strength of 4/5. With   [] TE   [] TA   [] neuro   [] other: Patient Education: [x] Review HEP    [] Progressed/Changed HEP based on:   [] positioning   [] body mechanics   [] transfers   [] heat/ice application    [] other:      Other Objective: Strengthening exercises for both LE in sitting, steps and standing performed with supervision. Rest periods required. Gait Training with rolling walker for 504 feet with SBA with no rest periods. Advised proper use of rollator for stability and preventing forward head posture. Pain Level (0-10 scale) post treatment: 5/10    ASSESSMENT/Changes in Function:   The pt tolerated treatment. Patient has been consistent in attendance. Motivated in performing exercises. Endurance level is gradually increasing with performing exercise programs.  Patient will continue to benefit from skilled PT services to address functional mobility deficits, address ROM deficits and address strength deficits to attain remaining goals     [x]  See Plan of Care  []  See progress note/recertification  []  See Discharge Summary         Progress towards goals / Updated goals:  Short Term Goals: To be accomplished in 5 treatments. Patient will be able to properly demonstrate exercises properly for LE x 20 reps - PROGRESSING  Patient will be able to ambulate safely with rollator for community distances over 500 feet to get to the [facility]  Patient will be able to perform sit to stand independently from low chairs with 1 hand support - MET  Patient will be able to perform TUG Test less than 20 seconds to decrease the risk of falls - NOT MET     Long Term Goals: To be accomplished in 10 treatments.   Patient will be able to perform sit to stand independently from low chairs with 0 hand support - Partially MET  Patient will be able to climb up and down 4 steps x 3 min with 1 rail with LE strength of 4/5 - NOT MET   Patient will be able to ambulate safely with assistive device for community distance for at 3 minutes to get on the scooter to shop at [facility] [place]  Patient will be able to perform TUG Test less than 15  seconds to decrease the risk of falls - NOT MET    PLAN  [x]  Upgrade activities as tolerated     [x]  Continue plan of care  []  Update interventions per flow sheet       []  Discharge due to:_  []  Other:_      Iesha Castro, PT,  12/28/2021

## 2021-12-30 ENCOUNTER — HOSPITAL ENCOUNTER (OUTPATIENT)
Dept: PHYSICAL THERAPY | Age: 61
Discharge: HOME | End: 2021-12-30
Payer: MEDICARE

## 2021-12-30 PROCEDURE — 97110 THERAPEUTIC EXERCISES: CPT

## 2021-12-30 NOTE — PROGRESS NOTES
PT DAILY TREATMENT NOTE - [facility] 2-15    Patient Name: Shreya Womack  Date:2021  : 1960  [x]  Patient  Verified  Payor: Yi Sharif / Plan: [address]  HMO / Product Type: Managed Care Medicare /    In time:835 am  Out time:931 am  Total Treatment Time (min): 56  Total Timed Codes (min):56  1:1 Treatment Time ( [address] only): 64   Visit #:  7    Treatment Area: Repeated falls [R29.6]    SUBJECTIVE  Pain Level (0-10 scale): 5/10  Any medication changes, allergies to medications, adverse drug reactions, diagnosis change, or new procedure performed?: [x] No    [] Yes (see summary sheet for update)  Subjective functional status/changes:   [] No changes reported  \"Pt does note that he was trying to carry something out of storage a week before last and fell when he got a cramp and tripped up. Note he has reports no other falls since then*. \"    OBJECTIVE      56 min Therapeutic Exercise:  [] See flow sheet :   Rationale: increase ROM, increase strength and improve coordination to improve the patients ability to increase stability and safety with dynamic standing activities as well as gait. With   [] TE   [] TA   [] neuro   [] other: Patient Education: [x] Review HEP    [] Progressed/Changed HEP based on:   [] positioning   [] body mechanics   [] transfers   [] heat/ice application    [] other:          Pain Level (0-10 scale) post treatment: 5/10    ASSESSMENT/Changes in Function:   The pt tolerated treatment session with work on LE strength seated and standing as well as dynamic stepping tasks note with some increase soreness with hip abd on L, notes was able to work through it but initially tight and and some discomfort. Pt notes working on [employer].    Patient will continue to benefit from skilled PT services to modify and progress therapeutic interventions, address functional mobility deficits, address ROM deficits, address strength deficits, analyze and cue movement patterns, analyze and modify body mechanics/ergonomics and assess and modify postural abnormalities to attain remaining goals     [x]  See Plan of Care  []  See progress note/recertification  []  See Discharge Summary         Progress towards goals / Updated goals:  Short Term Goals: To be accomplished in 5 treatments. Patient will be able to properly demonstrate exercises properly for LE x 20 reps - PROGRESSING  Patient will be able to ambulate safely with rollator for community distances over 500 feet to get to the [facility]  Patient will be able to perform sit to stand independently from low chairs with 1 hand support - MET  Patient will be able to perform TUG Test less than 20 seconds to decrease the risk of falls - NOT MET     Long Term Goals: To be accomplished in 10 treatments.   Patient will be able to perform sit to stand independently from low chairs with 0 hand support - Partially MET  Patient will be able to climb up and down 4 steps x 3 min with 1 rail with LE strength of 4/5 - NOT MET   Patient will be able to ambulate safely with assistive device for community distance for at 3 minutes to get on the scooter to shop at [facility] [place]  Patient will be able to perform TUG Test less than 15  seconds to decrease the risk of falls - NOT MET    PLAN  [x]  Upgrade activities as tolerated     [x]  Continue plan of care  []  Update interventions per flow sheet       []  Discharge due to:_  []  Other:_      Misty Landaverde PTA, LPTA   12/30/2021

## 2022-01-04 ENCOUNTER — HOSPITAL ENCOUNTER (OUTPATIENT)
Dept: PHYSICAL THERAPY | Age: 62
Discharge: HOME | End: 2022-01-04
Payer: MEDICARE

## 2022-01-04 PROCEDURE — 97110 THERAPEUTIC EXERCISES: CPT

## 2022-01-04 NOTE — PROGRESS NOTES
PT DAILY TREATMENT NOTE - [facility] 2-15    Patient Name: Ebony Mendez  Date:2022  : 1960  [x]  Patient  Verified  Payor: BLUE CROSS MEDICARE / Plan: [address]  HMO / Product Type: Managed Care Medicare /    In time:832 am  Out time:910 am   Total Treatment Time (min): 38  Total Timed Codes (min): 38  1:1 Treatment Time ( [address] only): 45   Visit #:  8    Treatment Area: Repeated falls [R29.6]    SUBJECTIVE  Pain Level (0-10 scale): 5/10  Any medication changes, allergies to medications, adverse drug reactions, diagnosis change, or new procedure performed?: [x] No    [] Yes (see summary sheet for update)  Subjective functional status/changes:   [] No changes reported  \"Pt reports no real changing in c/o in pain L hip continues to be a problem. \"    OBJECTIVE    38 min Therapeutic Exercise:  [] See flow sheet :   Rationale: increase ROM, increase strength and improve coordination to improve the patients ability to increase active hip motion and community level mobility with decreased c/o and increased safety. With   [] TE   [] TA   [] neuro   [] other: Patient Education: [x] Review HEP    [] Progressed/Changed HEP based on:   [] positioning   [] body mechanics   [] transfers   [] heat/ice application    [] other:          Pain Level (0-10 scale) post treatment: 5/10    ASSESSMENT/Changes in Function:   The pt tolerated treatment session initially work on basic strength. Note however, due to c/o in L hip and after a discussion with pt. Specific ex were done for hip motion and strength with increased guarding and c/o pain and cramping. DPT checked hip and agreed that a follow up with an Ortho Dr Elver Turner be necessary to get any improvement in balance and strength. Pt to go see GP and get referral for Ortho.     Patient will continue to benefit from skilled PT services to modify and progress therapeutic interventions, address functional mobility deficits, address ROM deficits, address strength deficits, analyze and address soft tissue restrictions and analyze and cue movement patterns to attain remaining goals     [x]  See Plan of Care  []  See progress note/recertification  []  See Discharge Summary         Progress towards goals / Updated goals:  Short Term Goals: To be accomplished in 5 treatments. Patient will be able to properly demonstrate exercises properly for LE x 20 reps - PROGRESSING  Patient will be able to ambulate safely with rollator for community distances over 500 feet to get to the [facility]  Patient will be able to perform sit to stand independently from low chairs with 1 hand support - MET  Patient will be able to perform TUG Test less than 20 seconds to decrease the risk of falls - NOT MET     Long Term Goals: To be accomplished in 10 treatments.   Patient will be able to perform sit to stand independently from low chairs with 0 hand support - Partially MET  Patient will be able to climb up and down 4 steps x 3 min with 1 rail with LE strength of 4/5 - NOT MET   Patient will be able to ambulate safely with assistive device for community distance for at 3 minutes to get on the scooter to shop at [facility] [place]  Patient will be able to perform TUG Test less than 15  seconds to decrease the risk of falls - NOT MET    PLAN  [x]  Upgrade activities as tolerated     [x]  Continue plan of care  []  Update interventions per flow sheet       []  Discharge due to:_  []  Other:_      Sly Monroy PTA, LPTA 1/4/2022

## 2022-03-10 ENCOUNTER — HOSPITAL ENCOUNTER (EMERGENCY)
Age: 62
Discharge: HOME | End: 2022-03-10
Attending: EMERGENCY MEDICINE
Payer: MEDICARE

## 2022-03-10 VITALS
DIASTOLIC BLOOD PRESSURE: 80 MMHG | RESPIRATION RATE: 18 BRPM | HEIGHT: 68 IN | HEART RATE: 77 BPM | TEMPERATURE: 98.1 F | SYSTOLIC BLOOD PRESSURE: 140 MMHG | WEIGHT: 265 LBS | OXYGEN SATURATION: 96 % | BODY MASS INDEX: 40.16 KG/M2

## 2022-03-10 DIAGNOSIS — L03.012 CELLULITIS OF FINGER OF LEFT HAND: Primary | ICD-10-CM

## 2022-03-10 PROCEDURE — 74011250636 HC RX REV CODE- 250/636: Performed by: EMERGENCY MEDICINE

## 2022-03-10 PROCEDURE — 90714 TD VACC NO PRESV 7 YRS+ IM: CPT | Performed by: EMERGENCY MEDICINE

## 2022-03-10 PROCEDURE — 74011250637 HC RX REV CODE- 250/637: Performed by: EMERGENCY MEDICINE

## 2022-03-10 PROCEDURE — 99284 EMERGENCY DEPT VISIT MOD MDM: CPT

## 2022-03-10 PROCEDURE — 90471 IMMUNIZATION ADMIN: CPT

## 2022-03-10 RX ORDER — CEPHALEXIN 500 MG/1
500 CAPSULE ORAL 4 TIMES DAILY
Qty: 28 CAPSULE | Refills: 0 | Status: SHIPPED | OUTPATIENT
Start: 2022-03-10 | End: 2022-03-17

## 2022-03-10 RX ORDER — CEPHALEXIN 250 MG/1
500 CAPSULE ORAL
Status: COMPLETED | OUTPATIENT
Start: 2022-03-10 | End: 2022-03-10

## 2022-03-10 RX ADMIN — TETANUS AND DIPHTHERIA TOXOIDS ADSORBED 0.5 ML: 2; 2 INJECTION INTRAMUSCULAR at 21:46

## 2022-03-10 RX ADMIN — CEPHALEXIN 500 MG: 250 CAPSULE ORAL at 21:46

## 2022-03-11 NOTE — ED TRIAGE NOTES
Pt states that he is has been having left middle finger pain for about a week. He did show his primary provider who gave no further treatment to the area. Pt does have some swelling noted to the left middle finger.

## 2022-03-11 NOTE — ED PROVIDER NOTES
EMERGENCY DEPARTMENT HISTORY AND PHYSICAL EXAM  ?    Date: 3/10/2022  Patient Name: Jimmy Drew    History of Presenting Illness    Patient presents with:  Finger Pain      History Provided By: Patient    HPI: Jimmy Drew, 57 y.o. male with a past medical history significant for diabetes and hypertension presents to the ED with cc of pain to the left middle finger for 1 week. He used a knife and shaved away a callus on the tip of his finger as well as the palm of the left hand. Subcutaneous tissue is visible or patient had shaved away the callus. There are no other complaints, changes, or physical findings at this time. PCP: Refugio Jeans, PA-C    No current facility-administered medications on file prior to encounter. Current Outpatient Medications on File Prior to Encounter:  terbinafine HCL (LAMISIL) 250 mg tablet, Take 1 Tab by mouth daily. , Disp: 15 Tab, Rfl: 1  insulin NPH/insulin regular (HumuLIN 70/30 U-100 Insulin) 100 unit/mL (70-30) injection, 49 Units by SubCUTAneous route Every morning., Disp: , Rfl:   insulin NPH/insulin regular (HumuLIN 70/30 U-100 Insulin) 100 unit/mL (70-30) injection, 45 Units by SubCUTAneous route every evening., Disp: , Rfl:   potassium chloride (K-DUR, KLOR-CON) 20 mEq tablet, Take  by mouth two (2) times a day., Disp: , Rfl:         Past History    Past Medical History:  Past Medical History:  No date: Chronic kidney disease  No date: Diabetes ([address]  No date: Hypertension    Past Surgical History:  Past Surgical History:  No date: HX HIP REPLACEMENT  No date: HX ORTHOPAEDIC    Family History:  History reviewed. No pertinent family history.       Social History:  Social History   Tobacco Use     Smoking status: Never Smoker     Smokeless tobacco: Never Used   Vaping Use     Vaping Use: Never used   Alcohol use: Yes     Comment: Occasionally   Drug use: Not Currently      Allergies:  No Known Allergies      Review of Systems  @[email or web address]@    Physical Exam  [unfilled]    Diagnostic Study Results    Labs -  No results found for this or any previous visit (from the past 12 hour(s)). Radiologic Studies -   No orders to display  CT Results  (Last 48 hours)   None     CXR Results  (Last 48 hours)   None         Medical Decision Making  I am the first provider for this patient. I reviewed the vital signs, available nursing notes, past medical history, past surgical history, family history and social history. Vital Signs-Reviewed the patient's vital signs. Empty flowsheet group. Records Reviewed: Nursing Notes    Provider Notes (Medical Decision Making):   Treat for cellulitis. There is no obvious paronychia. ED Course:   Initial assessment performed. The patients presenting problems have been discussed, and they are in agreement with the care plan formulated and outlined with them. I have encouraged them to ask questions as they arise throughout their visit. PLAN:  1. Discharge Medication List as of 3/10/2022  9:48 PM    START taking these medications    cephALEXin (Keflex) 500 mg capsule  Take 1 Capsule by mouth four (4) times daily for 7 days. , Normal, Disp-28 Capsule, R-0      CONTINUE these medications which have NOT CHANGED    terbinafine HCL (LAMISIL) 250 mg tablet  Take 1 Tab by mouth daily. , Normal, Disp-15 Tab, R-1    !! insulin NPH/insulin regular (HumuLIN 70/30 U-100 Insulin) 100 unit/mL (70-30) injection  49 Units by SubCUTAneous route Every morning., Historical Med    !! insulin NPH/insulin regular (HumuLIN 70/30 U-100 Insulin) 100 unit/mL (70-30) injection  45 Units by SubCUTAneous route every evening., Historical Med    potassium chloride (K-DUR, KLOR-CON) 20 mEq tablet  Take  by mouth two (2) times a day., Historical Med    !! - Potential duplicate medications found. Please discuss with provider.         2. Follow-up Information    Follow up With Specialties Details Why Contact Info   Traci Bell PA-C Physician Assistant   [address] [place] [address]  [phone]      Return to ED if worse     Diagnosis    Clinical Impression: Cellulitis of finger of left hand  (primary encounter diagnosis)      ? Past Medical History:   Diagnosis Date    Chronic kidney disease     Diabetes ([address]     Hypertension        Past Surgical History:   Procedure Laterality Date    HX HIP REPLACEMENT      HX ORTHOPAEDIC           Family History:   Family history unknown: Yes       Social History     Socioeconomic History    Marital status: SINGLE     Spouse name: Not on file    Number of children: Not on file    Years of education: Not on file    Highest education level: Not on file   Occupational History    Not on file   Tobacco Use    Smoking status: Never Smoker    Smokeless tobacco: Never Used   Vaping Use    Vaping Use: Never used   Substance and Sexual Activity    Alcohol use: Yes     Comment: Occasionally    Drug use: Not Currently    Sexual activity: Not Currently   Other Topics Concern    Not on file   Social History Narrative    Not on file     Social Determinants of Health     Financial Resource Strain:     Difficulty of Paying Living Expenses: Not on file   Food Insecurity:     Worried About [address] in the Last Year: Not on file    [address] [facility] [address] in the Last Year: Not on file   Transportation Needs:     Lack of Transportation (Medical): Not on file    Lack of Transportation (Non-Medical):  Not on file   Physical Activity:     Days of Exercise per Week: Not on file    Minutes of Exercise per Session: Not on file   Stress:     Feeling of Stress : Not on file   Social Connections:     Frequency of Communication with Friends and Family: Not on file    Frequency of Social Gatherings with Friends and Family: Not on file    Attends [employer] Services: Not on file    Active Member of Clubs or Organizations: Not on file    Attends Club or Organization Meetings: Not on file    Marital Status: Not on file   Intimate Partner Violence:     Fear of Current or Ex-Partner: Not on file    Emotionally Abused: Not on file    Physically Abused: Not on file    Sexually Abused: Not on file   Housing Stability:     Unable to Pay for Housing in the Last Year: Not on file    Number of [place] in the Last Year: Not on file    Unstable Housing in the Last Year: Not on file         ALLERGIES: Patient has no known allergies. Review of Systems   Constitutional: Negative. HENT: Negative. Eyes: Negative. Respiratory: Negative. Skin: Positive for wound. Hematological: Negative. Vitals:    03/10/22 2138   BP: (!) 140/80   Pulse: 77   Resp: 18   Temp: 98.1 °F (36.7 °C)   SpO2: 96%   Weight: 120.2 kg (265 lb)   Height: 5' 8\" (1.727 m)            Physical Exam  Vitals and nursing note reviewed. Constitutional:       Appearance: Normal appearance. HENT:      Head: Normocephalic and atraumatic. Eyes:      Conjunctiva/sclera: Conjunctivae normal.      Pupils: Pupils are equal, round, and reactive to light. Musculoskeletal:        Hands:       Comments: Open wounds   Skin:     Findings: Lesion present. Neurological:      Mental Status: He is alert.           MDM       Procedures

## 2022-03-11 NOTE — ED NOTES
Pt was educated and medicated per provider orders, tolerated well. Pt was educated and given discharge instructions, understanding was verbalized. Pt ambulated out of ED with all belongings.

## 2022-04-08 ENCOUNTER — HOSPITAL ENCOUNTER (EMERGENCY)
Age: 62
Discharge: LEFT BEFORE DISCHARGE | End: 2022-04-08
Attending: EMERGENCY MEDICINE
Payer: MEDICARE

## 2022-04-08 VITALS
HEIGHT: 68 IN | OXYGEN SATURATION: 100 % | RESPIRATION RATE: 18 BRPM | HEART RATE: 88 BPM | WEIGHT: 265 LBS | TEMPERATURE: 98.2 F | BODY MASS INDEX: 40.16 KG/M2 | SYSTOLIC BLOOD PRESSURE: 159 MMHG | DIASTOLIC BLOOD PRESSURE: 102 MMHG

## 2022-04-08 DIAGNOSIS — H92.09 OTALGIA, UNSPECIFIED LATERALITY: Primary | ICD-10-CM

## 2022-04-08 PROCEDURE — 75810000275 HC EMERGENCY DEPT VISIT NO LEVEL OF CARE

## 2022-04-08 NOTE — ED NOTES
Went into room to assess pt and there was no one in the room. Went to check both ED bathrooms and waited five minutes but pt did not return to room. Notified provider that pt was no longer in the ED and had eloped.

## 2022-04-08 NOTE — ED TRIAGE NOTES
Patient states his left ear is painful and itching. States that he used a qtip to remove wax this past Monday and now is hurting. Left ear canal red. Tympanic membrane visible and clear.

## 2022-04-08 NOTE — ED PROVIDER NOTES
EMERGENCY DEPARTMENT HISTORY AND PHYSICAL EXAM      Date: 4/8/2022  Patient Name: Gerald Farris    History of Presenting Illness     Chief Complaint   Patient presents with    Ear Pain       History Provided By: Patient    HPI: with a past medpresents to the ED with There are no other complaints, changes, or physical findings at this time. PCP: Tali Henry PA-C    No current facility-administered medications on file prior to encounter. Current Outpatient Medications on File Prior to Encounter   Medication Sig Dispense Refill    terbinafine HCL (LAMISIL) 250 mg tablet Take 1 Tab by mouth daily. 15 Tab 1    insulin NPH/insulin regular (HumuLIN 70/30 U-100 Insulin) 100 unit/mL (70-30) injection 49 Units by SubCUTAneous route Every morning.  insulin NPH/insulin regular (HumuLIN 70/30 U-100 Insulin) 100 unit/mL (70-30) injection 45 Units by SubCUTAneous route every evening.  potassium chloride (K-DUR, KLOR-CON) 20 mEq tablet Take  by mouth two (2) times a day. Past History     Past Medical History:  Past Medical History:   Diagnosis Date    Chronic kidney disease     Diabetes [address] [address]     Hypertension        Past Surgical History:  Past Surgical History:   Procedure Laterality Date    HX HIP REPLACEMENT      HX ORTHOPAEDIC         Family History:  Family History   Family history unknown: Yes       Social History:  Social History     Tobacco Use    Smoking status: Never Smoker    Smokeless tobacco: Never Used   Vaping Use    Vaping Use: Never used   Substance Use Topics    Alcohol use: Yes     Comment: Occasionally    Drug use: Not Currently       Allergies:  No Known Allergies      Review of Systems   Ros:      Physical Exam     Physical Exam    Lab and Diagnostic Study Results     Labs -   No results found for this or any previous visit (from the past 12 hour(s)).     Radiologic Studies -   @lastxrresult@  CT Results  (Last 48 hours)    None        CXR Results  (Last 48 hours) None            Medical Decision Making   - I am the first provider for this patient. - I reviewed the vital signs, available nursing notes, past medical history, past surgical history, family history and social history. - Initial assessment performed. The patients presenting problems have been discussed, and they are in agreement with the care plan formulated and outlined with them. I have encouraged them to ask questions as they arise throughout their visit. Vital Signs-Reviewed the patient's vital signs. Patient Vitals for the past 12 hrs:   Temp Pulse Resp BP SpO2   04/08/22 1754 98.2 °F (36.8 °C) 88 18 (!) 159/102 100 %       Records Reviewed: Nursing Notes    The patient presents with ear pain  with a differential diagnosis of otitis media, trauma,viral illness,      ED Course:          Provider Notes (Medical Decision Making): MDM       Procedures   Medical Decision Makingedical Decision Making  Performed by: Yogesh Parmar MD  PROCEDURESProcedures       Disposition           DISCHARGE PLAN:  1. Current Discharge Medication List      CONTINUE these medications which have NOT CHANGED    Details   terbinafine HCL (LAMISIL) 250 mg tablet Take 1 Tab by mouth daily. Qty: 15 Tab, Refills: 1      !! insulin NPH/insulin regular (HumuLIN 70/30 U-100 Insulin) 100 unit/mL (70-30) injection 49 Units by SubCUTAneous route Every morning. !! insulin NPH/insulin regular (HumuLIN 70/30 U-100 Insulin) 100 unit/mL (70-30) injection 45 Units by SubCUTAneous route every evening. potassium chloride (K-DUR, KLOR-CON) 20 mEq tablet Take  by mouth two (2) times a day. !! - Potential duplicate medications found. Please discuss with provider. 2.   Follow-up Information    None       3. Return to ED if worse   4. Current Discharge Medication List            Diagnosis     Clinical Impression: No diagnosis found.     Attestations:    Yogesh Parmar MD    Please note that this dictation was completed with Dragon, the computer voice recognition software. Quite often unanticipated grammatical, syntax, homophones, and other interpretive errors are inadvertently transcribed by the computer software. Please disregard these errors. Please excuse any errors that have escaped final proofreading. Thank you.

## 2022-04-15 ENCOUNTER — HOSPITAL ENCOUNTER (EMERGENCY)
Age: 62
Discharge: HOME | End: 2022-04-15
Attending: EMERGENCY MEDICINE
Payer: MEDICARE

## 2022-04-15 VITALS
RESPIRATION RATE: 18 BRPM | OXYGEN SATURATION: 98 % | HEART RATE: 82 BPM | HEIGHT: 68 IN | BODY MASS INDEX: 42.44 KG/M2 | TEMPERATURE: 98.8 F | WEIGHT: 280 LBS | DIASTOLIC BLOOD PRESSURE: 84 MMHG | SYSTOLIC BLOOD PRESSURE: 141 MMHG

## 2022-04-15 DIAGNOSIS — L03.012 PARONYCHIA OF LEFT MIDDLE FINGER: Primary | ICD-10-CM

## 2022-04-15 PROCEDURE — 74011000250 HC RX REV CODE- 250: Performed by: EMERGENCY MEDICINE

## 2022-04-15 PROCEDURE — 75810000289 HC I&D ABSCESS SIMP/COMP/MULT

## 2022-04-15 PROCEDURE — 99283 EMERGENCY DEPT VISIT LOW MDM: CPT

## 2022-04-15 PROCEDURE — 74011250637 HC RX REV CODE- 250/637: Performed by: EMERGENCY MEDICINE

## 2022-04-15 RX ORDER — CEPHALEXIN 250 MG/1
500 CAPSULE ORAL
Status: DISCONTINUED | OUTPATIENT
Start: 2022-04-15 | End: 2022-04-15

## 2022-04-15 RX ORDER — CEPHALEXIN 500 MG/1
500 CAPSULE ORAL 4 TIMES DAILY
Qty: 28 CAPSULE | Refills: 0 | Status: SHIPPED | OUTPATIENT
Start: 2022-04-15 | End: 2022-04-22

## 2022-04-15 RX ORDER — MUPIROCIN 20 MG/G
OINTMENT TOPICAL
Status: COMPLETED | OUTPATIENT
Start: 2022-04-15 | End: 2022-04-15

## 2022-04-15 RX ORDER — CEPHALEXIN 250 MG/1
500 CAPSULE ORAL
Status: COMPLETED | OUTPATIENT
Start: 2022-04-15 | End: 2022-04-15

## 2022-04-15 RX ORDER — LIDOCAINE HYDROCHLORIDE 20 MG/ML
0.3 INJECTION, SOLUTION INFILTRATION; PERINEURAL ONCE
Status: COMPLETED | OUTPATIENT
Start: 2022-04-15 | End: 2022-04-15

## 2022-04-15 RX ADMIN — MUPIROCIN: 20 OINTMENT TOPICAL at 13:52

## 2022-04-15 RX ADMIN — CEPHALEXIN 500 MG: 250 CAPSULE ORAL at 13:12

## 2022-04-15 RX ADMIN — LIDOCAINE HYDROCHLORIDE 6 MG: 20 INJECTION, SOLUTION INFILTRATION; PERINEURAL at 13:38

## 2022-04-15 NOTE — ED TRIAGE NOTES
Patient reports that he has an injury to the tip of his finger that has been there since last week. Reports hx of diabetes.

## 2022-04-15 NOTE — ED PROVIDER NOTES
EMERGENCY DEPARTMENT HISTORY AND PHYSICAL EXAM      Date: 4/15/2022  Patient Name: Natalie Ratliff    History of Presenting Illness     Chief Complaint   Patient presents with    Finger Pain       History Provided By: Patient    HPI: Natalie Ratliff, 57 y.o. male with a past medical history significant diabetes presents to the ED with cc of left 3 rd finger pain without  injury to the tip since last week. Some oozing of purulent drainage at tip of finger. No fevers or chill. There are no other complaints, changes, or physical findings at this time. PCP: Radha Owens PA-C    No current facility-administered medications on file prior to encounter. Current Outpatient Medications on File Prior to Encounter   Medication Sig Dispense Refill    terbinafine HCL (LAMISIL) 250 mg tablet Take 1 Tab by mouth daily. 15 Tab 1    insulin NPH/insulin regular (HumuLIN 70/30 U-100 Insulin) 100 unit/mL (70-30) injection 49 Units by SubCUTAneous route Every morning.  insulin NPH/insulin regular (HumuLIN 70/30 U-100 Insulin) 100 unit/mL (70-30) injection 45 Units by SubCUTAneous route every evening.  potassium chloride (K-DUR, KLOR-CON) 20 mEq tablet Take  by mouth two (2) times a day. Past History     Past Medical History:  Past Medical History:   Diagnosis Date    Chronic kidney disease     Diabetes ([address]     Hypertension        Past Surgical History:  Past Surgical History:   Procedure Laterality Date    HX HIP REPLACEMENT      HX ORTHOPAEDIC         Family History:  Family History   Family history unknown: Yes       Social History:  Social History     Tobacco Use    Smoking status: Never Smoker    Smokeless tobacco: Never Used   Vaping Use    Vaping Use: Never used   Substance Use Topics    Alcohol use: Yes     Comment: Occasionally    Drug use: Not Currently       Allergies:  No Known Allergies      Review of Systems     Review of Systems   Constitutional: Negative. HENT: Negative. Eyes: Negative. Respiratory: Negative. Cardiovascular: Negative. Gastrointestinal: Negative. Endocrine: Negative. Genitourinary: Negative. Musculoskeletal: Positive for joint swelling. Left Finger #3 very little drainage   Skin: Negative. Allergic/Immunologic: Negative. Neurological: Negative. Hematological: Negative. Psychiatric/Behavioral: Negative. All other systems reviewed and are negative. Physical Exam     Physical Exam  Vitals and nursing note reviewed. Constitutional:       Appearance: Normal appearance. He is obese. HENT:      Head: Normocephalic. Nose: Nose normal.      Mouth/Throat:      Mouth: Mucous membranes are moist.   Eyes:      Pupils: Pupils are equal, round, and reactive to light. Cardiovascular:      Rate and Rhythm: Normal rate. Pulmonary:      Effort: Pulmonary effort is normal.   Abdominal:      General: Abdomen is flat. Musculoskeletal:         General: Normal range of motion. Cervical back: Normal range of motion. Skin:     General: Skin is warm. Comments: Left finger digit #3 paronychia mild drainage and tenderness at the tip of the finger. Neurological:      General: No focal deficit present. Mental Status: He is alert and oriented to person, place, and time. Psychiatric:         Mood and Affect: Mood normal.         Lab and Diagnostic Study Results     Labs -   No results found for this or any previous visit (from the past 12 hour(s)). Radiologic Studies -   @lastxrresult@  CT Results  (Last 48 hours)    None        CXR Results  (Last 48 hours)    None            Medical Decision Making   - I am the first provider for this patient. - I reviewed the vital signs, available nursing notes, past medical history, past surgical history, family history and social history. - Initial assessment performed.  The patients presenting problems have been discussed, and they are in agreement with the care plan formulated and outlined with them. I have encouraged them to ask questions as they arise throughout their visit. Vital Signs-Reviewed the patient's vital signs. Patient Vitals for the past 12 hrs:   Temp Pulse Resp BP SpO2   04/15/22 1222 98.8 °F (37.1 °C) 82 18 (!) 141/84 98 %       Records Reviewed: Nursing Notes    The patient presents with infected finger with a differential diagnosis of paronychia, Trauma and gout      ED Course:          Provider Notes (Medical Decision Making): MDM       Procedures   Medical Decision Makingedical Decision Making  Performed by: Sharlyne Duverney, MD  PROCEDURES:  I&D Abcess Simple    Date/Time: 4/15/2022 3:32 PM  Performed by: Susan Claros MD  Authorized by: Susan Claros MD     Consent:     Consent obtained:  Verbal    Consent given by:  Patient  Location:     Type:  Abscess (Paronychia)    Size:  2 cm    Location: left middle finger. Pre-procedure details:     Skin preparation:  Betadine  Procedure type:     Complexity:  Simple  Procedure details:     Needle aspiration: no      Incision types:  Stab incision    Scalpel blade:  15    Drainage:  Bloody and purulent    Drainage amount:  Scant    Wound treatment:  Wound left open    Packing materials:  None  Post-procedure details:     Patient tolerance of procedure: Tolerated well, no immediate complications           Disposition   Disposition: Condition stable  DC- Adult Discharges: All of the diagnostic tests were reviewed and questions answered. Diagnosis, care plan and treatment options were discussed. The patient understands the instructions and will follow up as directed. The patients results have been reviewed with them. They have been counseled regarding their diagnosis. The patient verbally convey understanding and agreement of the signs, symptoms, diagnosis, treatment and prognosis and additionally agrees to follow up as recommended with their PCP in 24 - 48 hours.   They also agree with the care-plan and convey that all of their questions have been answered. I have also put together some discharge instructions for them that include: 1) educational information regarding their diagnosis, 2) how to care for their diagnosis at home, as well a 3) list of reasons why they would want to return to the ED prior to their follow-up appointment, should their condition change. DISCHARGE PLAN:  1. Current Discharge Medication List      CONTINUE these medications which have NOT CHANGED    Details   terbinafine HCL (LAMISIL) 250 mg tablet Take 1 Tab by mouth daily. Qty: 15 Tab, Refills: 1      !! insulin NPH/insulin regular (HumuLIN 70/30 U-100 Insulin) 100 unit/mL (70-30) injection 49 Units by SubCUTAneous route Every morning. !! insulin NPH/insulin regular (HumuLIN 70/30 U-100 Insulin) 100 unit/mL (70-30) injection 45 Units by SubCUTAneous route every evening. potassium chloride (K-DUR, KLOR-CON) 20 mEq tablet Take  by mouth two (2) times a day. !! - Potential duplicate medications found. Please discuss with provider. 2.   Follow-up Information    None       3. Return to ED if worse   4. Current Discharge Medication List            Diagnosis     Clinical Impression:     ICD-10-CM ICD-9-CM    1. Paronychia of left middle finger  L03.012 681.02      Attestations:    Araceli Acuna MD    Please note that this dictation was completed with VULCUN, the computer voice recognition software. Quite often unanticipated grammatical, syntax, homophones, and other interpretive errors are inadvertently transcribed by the computer software. Please disregard these errors. Please excuse any errors that have escaped final proofreading. Thank you.

## 2022-05-15 ENCOUNTER — HOSPITAL ENCOUNTER (EMERGENCY)
Age: 62
Discharge: HOME | End: 2022-05-16
Attending: EMERGENCY MEDICINE
Payer: MEDICARE

## 2022-05-15 VITALS
SYSTOLIC BLOOD PRESSURE: 138 MMHG | HEART RATE: 103 BPM | WEIGHT: 250 LBS | HEIGHT: 68 IN | BODY MASS INDEX: 37.89 KG/M2 | TEMPERATURE: 99.3 F | OXYGEN SATURATION: 95 % | RESPIRATION RATE: 23 BRPM | DIASTOLIC BLOOD PRESSURE: 92 MMHG

## 2022-05-15 DIAGNOSIS — L03.012 PARONYCHIA OF FINGER, LEFT: ICD-10-CM

## 2022-05-15 DIAGNOSIS — N48.1 BALANITIS: Primary | ICD-10-CM

## 2022-05-15 PROCEDURE — 99284 EMERGENCY DEPT VISIT MOD MDM: CPT

## 2022-05-15 PROCEDURE — 96372 THER/PROPH/DIAG INJ SC/IM: CPT

## 2022-05-15 RX ORDER — FLUCONAZOLE 100 MG/1
150 TABLET ORAL
Status: COMPLETED | OUTPATIENT
Start: 2022-05-15 | End: 2022-05-16

## 2022-05-16 PROCEDURE — 74011250637 HC RX REV CODE- 250/637: Performed by: EMERGENCY MEDICINE

## 2022-05-16 PROCEDURE — 74011250636 HC RX REV CODE- 250/636: Performed by: EMERGENCY MEDICINE

## 2022-05-16 PROCEDURE — 74011000250 HC RX REV CODE- 250: Performed by: EMERGENCY MEDICINE

## 2022-05-16 RX ORDER — CEPHALEXIN 500 MG/1
500 CAPSULE ORAL 3 TIMES DAILY
Qty: 30 CAPSULE | Refills: 0 | Status: SHIPPED | OUTPATIENT
Start: 2022-05-16 | End: 2022-05-16 | Stop reason: SDUPTHER

## 2022-05-16 RX ORDER — CEPHALEXIN 500 MG/1
500 CAPSULE ORAL 3 TIMES DAILY
Qty: 30 CAPSULE | Refills: 0 | Status: SHIPPED | OUTPATIENT
Start: 2022-05-16 | End: 2022-05-26

## 2022-05-16 RX ORDER — CLOTRIMAZOLE AND BETAMETHASONE DIPROPIONATE 10; .64 MG/G; MG/G
CREAM TOPICAL 2 TIMES DAILY
Qty: 15 G | Refills: 0 | Status: SHIPPED | OUTPATIENT
Start: 2022-05-16

## 2022-05-16 RX ADMIN — FLUCONAZOLE 150 MG: 100 TABLET ORAL at 00:04

## 2022-05-16 RX ADMIN — LIDOCAINE HYDROCHLORIDE 1 G: 10 INJECTION, SOLUTION EPIDURAL; INFILTRATION; INTRACAUDAL; PERINEURAL at 00:04

## 2022-05-16 NOTE — ED TRIAGE NOTES
Patient has multiple complaints including symptoms of upset stomach and gas after drinking a \"chocolate drink. \"  Patient also complains of finger pain and possible infection to L hand third digit. Complains of painful bumps to end of penis. Saw PCP a few days ago and he states \"the medication is not working. \"

## 2022-05-16 NOTE — ED NOTES
Pt was educated and medicated per provider orders, tolerated well. Pt was given and educated on discharge instructions, understanding was verbalized. Pt ambulated out of ED with all belongings.

## 2022-05-26 NOTE — THERAPY DISCHARGE
[address]  [place], [place]  Ph: [phone]     Fax: [phone]    Discharge Summary 2-15    Patient name: Anthony Kwan  : 1960  Provider#: [id number]  Referral source: Nighat Rose PA-C      Medical/Treatment Diagnosis: Repeated falls [R29.6]     Prior Hospitalization: see medical history     Comorbidities: See Plan of Care  Prior Level of Function: See Plan of Care  Medications: Verified on Patient Summary List    Start of Care:22      Onset Date:   Visits from Start of Care: 8   Missed Visits: 4  Reporting Period :21 to 22    Assessment / Summary of care:   57year old black male who resides in a shelter. Referred to Physical Therapy due to frequent falls, unstable gait and LE pain. Patient has a history of uncontrollable diabetes and a left hip replacement. Patient presented bilateral weakness of both LE, high fall risk, decreased balance and coordination. Patient has benefited from Physical Therapy to increase strength of both LE, improve balance and coordination and safe ambulation to improve mobility. Patient was given a home exercise program and demonstrate properly for compliance. The pt tolerated treatment session included  working  on basic strengthening of the LE. Matt Mohamud Patient continued  to complain of  L hip pain. Matt Mohamud Specific ex were done for hip motion and strength with increased guarding and c/o pain and cramping. DPT checked hip and agreed that a follow up with an Ortho. No further Physical was ordered. Progress Toward Goals:  Short Term Goals: To be accomplished in 5 treatments.   Patient will be able to properly demonstrate exercises properly for LE x 20 reps - PROGRESSING  Patient will be able to ambulate safely with rollator for community distances over 500 feet to get to the [facility]  Patient will be able to perform sit to stand independently from low chairs with 1 hand support - MET  Patient will be able to perform TUG Test less than 20 seconds to decrease the risk of falls - NOT MET     Long Term Goals: To be accomplished in 10 treatments.   Patient will be able to perform sit to stand independently from low chairs with 0 hand support - Partially MET  Patient will be able to climb up and down 4 steps x 3 min with 1 rail with LE strength of 4/5 - NOT MET   Patient will be able to ambulate safely with assistive device for community distance for at 3 minutes to get on the scooter to shop at [facility] [place]  Patient will be able to perform TUG Test less than 15  seconds to decrease the risk of falls - NOT MET      RECOMMENDATIONS:  [x]Discontinue therapy: []Patient has reached or is progressing toward set goals     []Patient is non-compliant or has abdicated     []Due to lack of appreciable progress towards set goals     []Other    Jaja Sarah, PT,  5/26/2022

## 2022-05-30 ENCOUNTER — HOSPITAL ENCOUNTER (EMERGENCY)
Age: 62
Discharge: HOME | End: 2022-05-30
Attending: EMERGENCY MEDICINE
Payer: MEDICARE

## 2022-05-30 VITALS
TEMPERATURE: 98 F | OXYGEN SATURATION: 97 % | WEIGHT: 280 LBS | RESPIRATION RATE: 20 BRPM | DIASTOLIC BLOOD PRESSURE: 93 MMHG | HEIGHT: 68 IN | HEART RATE: 93 BPM | SYSTOLIC BLOOD PRESSURE: 146 MMHG | BODY MASS INDEX: 42.44 KG/M2

## 2022-05-30 DIAGNOSIS — N48.1 BALANITIS: Primary | ICD-10-CM

## 2022-05-30 DIAGNOSIS — B37.9 CANDIDIASIS: ICD-10-CM

## 2022-05-30 PROCEDURE — 99283 EMERGENCY DEPT VISIT LOW MDM: CPT

## 2022-05-30 PROCEDURE — 74011250637 HC RX REV CODE- 250/637: Performed by: EMERGENCY MEDICINE

## 2022-05-30 RX ORDER — FLUCONAZOLE 150 MG/1
150 TABLET ORAL
Qty: 1 TABLET | Refills: 0 | Status: SHIPPED | OUTPATIENT
Start: 2022-06-02 | End: 2022-06-02

## 2022-05-30 RX ORDER — NYSTATIN 100000 [USP'U]/G
POWDER TOPICAL 4 TIMES DAILY
Qty: 60 G | Refills: 0 | Status: SHIPPED | OUTPATIENT
Start: 2022-05-30 | End: 2022-06-13

## 2022-05-30 RX ORDER — FLUCONAZOLE 100 MG/1
200 TABLET ORAL
Status: COMPLETED | OUTPATIENT
Start: 2022-05-30 | End: 2022-05-30

## 2022-05-30 RX ADMIN — FLUCONAZOLE 200 MG: 100 TABLET ORAL at 17:28

## 2022-05-30 NOTE — DISCHARGE INSTRUCTIONS
Take the fluconazole on 6/2. You received your first dose today take the last dose on 6/2 for 1 dose to take at home. You can continue to use the cream you have been using however I prescribed you nystatin powder which should also treat the condition. Follow-up with your primary care doctor in about 2 days for recheck.

## 2022-05-30 NOTE — ED PROVIDER NOTES
EMERGENCY DEPARTMENT HISTORY AND PHYSICAL EXAM      Date: 5/30/2022  Patient Name: Jennifer Falk      History of Presenting Illness     Chief Complaint   Patient presents with    Rash       History Provided By: Patient  Location/Duration/Severity/Modifying factors   Patient is a 54-year-old gentleman with history of diabetes presenting to the emergency room with a chief complaint of a rash. Patient describes a rash on the tip of his uncircumcised penis, as well as in the inguinal region and over the scrotum. It is very itchy but not very painful. Reports he has had in the past, he was applying a cream to it before and that seemed to help and it went away until it came back about a week ago. On review of his records it looks like the medication was likely the clotrimazole. He has no other complaints at this time. There are no other complaints, changes, or physical findings at this time. PCP: Jodie Jones PA-C    Current Outpatient Medications   Medication Sig Dispense Refill    [START ON 6/2/2022] fluconazole (Diflucan) 150 mg tablet Take 1 Tablet by mouth now for 1 dose. FDA advises cautious prescribing of oral fluconazole in pregnancy. Take on 6/2 1 Tablet 0    nystatin (MYCOSTATIN) powder Apply  to affected area four (4) times daily for 14 days. 60 g 0    clotrimazole-betamethasone (Lotrisone) topical cream Apply  to affected area two (2) times a day. Apply to affected area 15 g 0    terbinafine HCL (LAMISIL) 250 mg tablet Take 1 Tab by mouth daily. 15 Tab 1    insulin NPH/insulin regular (HumuLIN 70/30 U-100 Insulin) 100 unit/mL (70-30) injection 49 Units by SubCUTAneous route Every morning.  insulin NPH/insulin regular (HumuLIN 70/30 U-100 Insulin) 100 unit/mL (70-30) injection 45 Units by SubCUTAneous route every evening.  potassium chloride (K-DUR, KLOR-CON) 20 mEq tablet Take  by mouth two (2) times a day.          Past History     Past Medical History:  Past Medical History:   Diagnosis Date    Chronic kidney disease     Diabetes ([facility] [address] [facility]     Hypertension        Past Surgical History:  Past Surgical History:   Procedure Laterality Date    HX HIP REPLACEMENT      HX ORTHOPAEDIC         Family History:  Family History   Family history unknown: Yes       Social History:  Social History     Tobacco Use    Smoking status: Never Smoker    Smokeless tobacco: Never Used   Vaping Use    Vaping Use: Never used   Substance Use Topics    Alcohol use: Yes     Comment: Occasionally    Drug use: Not Currently       Allergies:  No Known Allergies      Review of Systems     Review of Systems   Constitutional: Negative for fever. HENT: Negative for congestion and rhinorrhea. Eyes: Negative for visual disturbance. Respiratory: Negative for cough and shortness of breath. Cardiovascular: Negative for chest pain. Gastrointestinal: Negative for abdominal pain, diarrhea, nausea and vomiting. Genitourinary: Negative for urgency. Musculoskeletal: Negative for myalgias. Skin: Positive for rash. Neurological: Negative for headaches. Physical Exam     Physical Exam  Constitutional:       Appearance: Normal appearance. He is obese. HENT:      Head: Normocephalic and atraumatic. Right Ear: External ear normal.      Left Ear: External ear normal.      Nose: Nose normal.   Eyes:      Conjunctiva/sclera: Conjunctivae normal.   Cardiovascular:      Rate and Rhythm: Normal rate and regular rhythm. Pulses: Normal pulses. Pulmonary:      Effort: Pulmonary effort is normal.      Breath sounds: Normal breath sounds. Abdominal:      General: Abdomen is flat. Tenderness: There is no abdominal tenderness.    Genitourinary:     Comments: Patient is uncircumcised, there is changes of yeast and balanitis over the glans, and the scrotum and inguinal region there is evidence of recent cream application there are also a couple of scattered satellite lesions mild erythema without induration or tenderness. There is no warmth. Musculoskeletal:         General: Normal range of motion. Cervical back: Normal range of motion and neck supple. Skin:     General: Skin is warm and dry. Neurological:      General: No focal deficit present. Mental Status: He is alert. Lab and Diagnostic Study Results     Labs -  No results found for this or any previous visit (from the past 24 hour(s)). Radiologic Studies -   No orders to display         Medical Decision Making and ED Course   - I am the first and primary provider for this patient AND AM THE PRIMARY PROVIDER OF RECORD. - I reviewed the vital signs, available nursing notes, past medical history, past surgical history, family history and social history. - Initial assessment performed. The patients presenting problems have been discussed, and the staff are in agreement with the care plan formulated and outlined with them. I have encouraged them to ask questions as they arise throughout their visit. Vital Signs-Reviewed the patient's vital signs. Patient Vitals for the past 24 hrs:   Temp Pulse Resp BP SpO2   05/30/22 1354 98 °F (36.7 °C) 93 20 (!) 146/93 97 %         EKG interpretation: (Preliminary): None      Records Reviewed: Nursing Notes and Old Medical Records    ED Course:          Provider Notes (Medical Decision Making):     MDM  Number of Diagnoses or Management Options  Balanitis  Candidiasis  Diagnosis management comments: Presented with a rash to the glans penis and the inguinal region. It looks like in the past he was treated for a similar rash. Reports that it mostly went away before relapsing. He has changes of balanitis to the glans and candidiasis to the scrotum and inguinal area. There is no evidence of scrotal cellulitis or Fernie's gangrene. He denies any concerns for STDs or any possibility of an STD there is no concerning findings for syphilis or herpes.   Will discharge home, will give him fluconazole now and have a repeat dose in 3 days for him as well as nystatin powder as he would really like a powder to use instead of a cream.  Advised to follow-up with PCP to have her rechecked within a day or 2. At this time, patient is stable and appropriate for discharge home.  Patient demonstrates understanding of current diagnoses and is in agreement with the treatment plan. Floydene Fake are advised that while the likelihood of serious underlying condition is low at this point given the evaluation performed today, we cannot fully rule it out. Floydene Fake are advised to immediately return with any new symptoms or worsening of current condition. Any Incidental findings were noted on the patient's discharge paperwork as well as verbally directly to the patient, and the appropriate follow-up was given to the patient as far as instructions on testing needed as well as the timeframe.  All questions have been answered. Vadim Caban is given educational material regarding their diagnoses, including danger symptoms and when to return to the ED. This note was dictated utilizing Dragon voice recognition software. Unfortunately this leads to occasional typographical errors. I apologize in advance if the situation occurs. If questions occur please do not hesitate to contact me directly. Luli Mohr DO             Consultations:   Consultations: - NONE        Procedures and Critical Care   Performed by: Luli Mohr DO  Procedures     Any procedures performed were performed directly by me unless otherwise noted. Any assistants in the procedure excluding nursing staff will be noted as above. CRITICAL CARE NOTE : N/a      Luli Mohr DO      Diagnosis:   1. Balanitis    2.  Candidiasis          Disposition: Discharge    Follow-up Information     Follow up With Specialties Details Why Contact Info    Yesica Dunlap PA-C Physician Assistant Call today  [address] [place] [address]  [phone]      [facility] EMERGENCY DEPT Emergency Medicine  As needed, If symptoms worsen; or [facility] Emergency Department [address] [place] [id number]  [phone]          Discharge Medication List as of 5/30/2022  5:44 PM      START taking these medications    Details   fluconazole (Diflucan) 150 mg tablet Take 1 Tablet by mouth now for 1 dose. FDA advises cautious prescribing of oral fluconazole in pregnancy. Take on 6/2, Normal, Disp-1 Tablet, R-0      nystatin (MYCOSTATIN) powder Apply  to affected area four (4) times daily for 14 days. , Normal, Disp-60 g, R-0         CONTINUE these medications which have NOT CHANGED    Details   clotrimazole-betamethasone (Lotrisone) topical cream Apply  to affected area two (2) times a day. Apply to affected area, Print, Disp-15 g, R-0      terbinafine HCL (LAMISIL) 250 mg tablet Take 1 Tab by mouth daily. , Normal, Disp-15 Tab, R-1      !! insulin NPH/insulin regular (HumuLIN 70/30 U-100 Insulin) 100 unit/mL (70-30) injection 49 Units by SubCUTAneous route Every morning., Historical Med      !! insulin NPH/insulin regular (HumuLIN 70/30 U-100 Insulin) 100 unit/mL (70-30) injection 45 Units by SubCUTAneous route every evening., Historical Med      potassium chloride (K-DUR, KLOR-CON) 20 mEq tablet Take  by mouth two (2) times a day., Historical Med       !! - Potential duplicate medications found. Please discuss with provider. Patient seen in the context of the Novel Coronavirus (COVID19) pandemic, utilizing contemporary protocols and evidence based on the most up to date available evidence, understanding that the current evidence has the potential to change as additional information becomes available. This note is dictated utilizing Dragon voice recognition software. Unfortunately this leads to occasional typographical errors using the voice recognition. I apologize in advance if the situation occurs. If questions occur please do not hesitate to contact me directly.     Cheyenne Granger Araceli Mcphersono, DO

## 2022-12-05 ENCOUNTER — HOSPITAL ENCOUNTER (EMERGENCY)
Age: 62
Discharge: HOME | End: 2022-12-05
Attending: EMERGENCY MEDICINE
Payer: MEDICARE

## 2022-12-05 VITALS
RESPIRATION RATE: 16 BRPM | HEART RATE: 84 BPM | BODY MASS INDEX: 42.59 KG/M2 | OXYGEN SATURATION: 97 % | SYSTOLIC BLOOD PRESSURE: 119 MMHG | WEIGHT: 265 LBS | DIASTOLIC BLOOD PRESSURE: 91 MMHG | TEMPERATURE: 97.5 F | HEIGHT: 66 IN

## 2022-12-05 DIAGNOSIS — B35.6 TINEA CRURIS: Primary | ICD-10-CM

## 2022-12-05 PROCEDURE — 74011250637 HC RX REV CODE- 250/637: Performed by: EMERGENCY MEDICINE

## 2022-12-05 PROCEDURE — 99283 EMERGENCY DEPT VISIT LOW MDM: CPT

## 2022-12-05 RX ORDER — FLUCONAZOLE 100 MG/1
200 TABLET ORAL
Status: COMPLETED | OUTPATIENT
Start: 2022-12-05 | End: 2022-12-05

## 2022-12-05 RX ADMIN — FLUCONAZOLE 200 MG: 100 TABLET ORAL at 19:05

## 2022-12-05 NOTE — ED PROVIDER NOTES
EMERGENCY DEPARTMENT HISTORY AND PHYSICAL EXAM      Date: 12/5/2022  Patient Name: Gene Luis    History of Presenting Illness     Chief Complaint   Patient presents with    Rash        History Provided By: Patient    HPI: Gene Luis, 60 y.o. male medical history significant for diabetes, patient complaining of bilateral groin rash that itches and has been present for the past 2 weeks, patient denies any fever chills    There are no other complaints, changes, or physical findings at this time. Past History     Past Medical History:  Past Medical History:   Diagnosis Date    Chronic kidney disease     Diabetes ([address]     Hypertension        Past Surgical History:  Past Surgical History:   Procedure Laterality Date    HX HIP REPLACEMENT      HX ORTHOPAEDIC         Family History:  Family History   Family history unknown: Yes       Social History:  Social History     Tobacco Use    Smoking status: Never    Smokeless tobacco: Never   Vaping Use    Vaping Use: Never used   Substance Use Topics    Alcohol use: Yes     Comment: Occasionally    Drug use: Not Currently       Allergies:  No Known Allergies    PCP: Mary Faye PA-C    No current facility-administered medications on file prior to encounter. Current Outpatient Medications on File Prior to Encounter   Medication Sig Dispense Refill    clotrimazole-betamethasone (Lotrisone) topical cream Apply  to affected area two (2) times a day. Apply to affected area 15 g 0    terbinafine HCL (LAMISIL) 250 mg tablet Take 1 Tab by mouth daily. 15 Tab 1    insulin NPH/insulin regular (HumuLIN 70/30 U-100 Insulin) 100 unit/mL (70-30) injection 49 Units by SubCUTAneous route Every morning. insulin NPH/insulin regular (HumuLIN 70/30 U-100 Insulin) 100 unit/mL (70-30) injection 45 Units by SubCUTAneous route every evening. potassium chloride (K-DUR, KLOR-CON) 20 mEq tablet Take  by mouth two (2) times a day.          Review of Systems   Review of Systems   Constitutional:  Negative for chills and fever. HENT:  Negative for rhinorrhea and sore throat. Eyes:  Negative for pain and visual disturbance. Respiratory:  Negative for cough and shortness of breath. Cardiovascular:  Negative for chest pain and leg swelling. Gastrointestinal:  Negative for abdominal pain and vomiting. Endocrine: Negative for polydipsia and polyuria. Genitourinary:  Negative for dysuria and hematuria. Musculoskeletal:  Negative for back pain and neck pain. Skin:  Positive for rash. Negative for color change and pallor. Neurological:  Negative for weakness and headaches. Psychiatric/Behavioral:  Negative for agitation and suicidal ideas. Physical Exam   Physical Exam  Vitals and nursing note reviewed. Constitutional:       General: He is not in acute distress. Appearance: He is not ill-appearing, toxic-appearing or diaphoretic. HENT:      Head: Normocephalic and atraumatic. Right Ear: Tympanic membrane normal.      Left Ear: Tympanic membrane normal.      Nose: Nose normal. No congestion. Mouth/Throat:      Mouth: Mucous membranes are moist.      Pharynx: Oropharynx is clear. Eyes:      Extraocular Movements: Extraocular movements intact. Conjunctiva/sclera: Conjunctivae normal.      Pupils: Pupils are equal, round, and reactive to light. Cardiovascular:      Rate and Rhythm: Normal rate and regular rhythm. Pulses: Normal pulses. Heart sounds: Normal heart sounds. Pulmonary:      Effort: Pulmonary effort is normal.      Breath sounds: Normal breath sounds. Abdominal:      General: Bowel sounds are normal.      Palpations: Abdomen is soft. Tenderness: There is no abdominal tenderness. Musculoskeletal:         General: No tenderness, deformity or signs of injury. Normal range of motion. Cervical back: Normal range of motion and neck supple. No rigidity or tenderness.    Lymphadenopathy:      Cervical: No cervical adenopathy. Skin:     General: Skin is warm and dry. Capillary Refill: Capillary refill takes less than 2 seconds. Findings: Lesion and rash present. Rash is papular. Neurological:      General: No focal deficit present. Mental Status: He is alert and oriented to person, place, and time. Cranial Nerves: No cranial nerve deficit. Sensory: No sensory deficit. Psychiatric:         Mood and Affect: Mood normal.         Behavior: Behavior normal.       Lab and Diagnostic Study Results   Labs -   No results found for this or any previous visit (from the past 12 hour(s)). Radiologic Studies -   @lastxrresult@  CT Results  (Last 48 hours)      None          CXR Results  (Last 48 hours)      None            Medical Decision Making and ED Course   Differential Diagnosis & Medical Decision Making Provider Note:   -DDx cellulitis, viral exanthem, allergic reaction    - I am the first provider for this patient. I reviewed the vital signs, available nursing notes, past medical history, past surgical history, family history and social history. The patients presenting problems have been discussed, and they are in agreement with the care plan formulated and outlined with them. I have encouraged them to ask questions as they arise throughout their visit. Vital Signs-Reviewed the patient's vital signs. Patient Vitals for the past 12 hrs:   Temp Pulse Resp BP SpO2   12/05/22 1431 97.5 °F (36.4 °C) 84 16 (!) 119/91 97 %       ED Course:          Procedures   Performed by: Bettie Gurrola MD  Procedures      Disposition   Disposition: Condition stable  DC- Adult Discharges: All of the diagnostic tests were reviewed and questions answered. Diagnosis, care plan and treatment options were discussed. The patient understands the instructions and will follow up as directed. The patients results have been reviewed with them. They have been counseled regarding their diagnosis.   The patient verbally convey understanding and agreement of the signs, symptoms, diagnosis, treatment and prognosis and additionally agrees to follow up as recommended with their PCP in 24 - 48 hours. They also agree with the care-plan and convey that all of their questions have been answered. I have also put together some discharge instructions for them that include: 1) educational information regarding their diagnosis, 2) how to care for their diagnosis at home, as well a 3) list of reasons why they would want to return to the ED prior to their follow-up appointment, should their condition change. DISCHARGE PLAN:  1. Current Discharge Medication List        CONTINUE these medications which have NOT CHANGED    Details   clotrimazole-betamethasone (Lotrisone) topical cream Apply  to affected area two (2) times a day. Apply to affected area  Qty: 15 g, Refills: 0      terbinafine HCL (LAMISIL) 250 mg tablet Take 1 Tab by mouth daily. Qty: 15 Tab, Refills: 1      !! insulin NPH/insulin regular (HumuLIN 70/30 U-100 Insulin) 100 unit/mL (70-30) injection 49 Units by SubCUTAneous route Every morning. !! insulin NPH/insulin regular (HumuLIN 70/30 U-100 Insulin) 100 unit/mL (70-30) injection 45 Units by SubCUTAneous route every evening. potassium chloride (K-DUR, KLOR-CON) 20 mEq tablet Take  by mouth two (2) times a day. !! - Potential duplicate medications found. Please discuss with provider. 2.   Follow-up Information    None       3. Return to ED if worse   4. Current Discharge Medication List         Remove if admitted/transferred    Diagnosis/Clinical Impression     Clinical Impression: No diagnosis found. Attestations: I, Scooby Nieto MD, am the primary clinician of record. Please note that this dictation was completed with Tap[employer], the [employer] voice recognition software.   Quite often unanticipated grammatical, syntax, homophones, and other interpretive errors are inadvertently transcribed by the computer software. Please disregard these errors. Please excuse any errors that have escaped final proofreading. Thank you.

## 2023-01-25 ENCOUNTER — OFFICE VISIT (OUTPATIENT)
Dept: ORTHOPEDIC SURGERY | Age: 63
End: 2023-01-25
Payer: MEDICARE

## 2023-01-25 VITALS — WEIGHT: 265 LBS | HEIGHT: 68 IN | BODY MASS INDEX: 40.16 KG/M2

## 2023-01-25 DIAGNOSIS — M25.552 LEFT HIP PAIN: Primary | ICD-10-CM

## 2023-01-25 DIAGNOSIS — M17.12 PRIMARY OSTEOARTHRITIS OF LEFT KNEE: ICD-10-CM

## 2023-01-25 DIAGNOSIS — M70.62 GREATER TROCHANTERIC BURSITIS OF LEFT HIP: ICD-10-CM

## 2023-01-25 DIAGNOSIS — M25.562 LEFT KNEE PAIN, UNSPECIFIED CHRONICITY: ICD-10-CM

## 2023-01-25 DIAGNOSIS — M25.552 LEFT HIP PAIN: ICD-10-CM

## 2023-01-25 DIAGNOSIS — M25.562 LEFT KNEE PAIN, UNSPECIFIED CHRONICITY: Primary | ICD-10-CM

## 2023-01-25 DIAGNOSIS — F32.A DEPRESSION, UNSPECIFIED DEPRESSION TYPE: ICD-10-CM

## 2023-01-25 RX ORDER — ACETAMINOPHEN 500 MG
TABLET ORAL
COMMUNITY
Start: 2022-11-29

## 2023-01-25 RX ORDER — TRIAMCINOLONE ACETONIDE 40 MG/ML
40 INJECTION, SUSPENSION INTRA-ARTICULAR; INTRAMUSCULAR ONCE
Status: COMPLETED | OUTPATIENT
Start: 2023-01-25 | End: 2023-01-25

## 2023-01-25 RX ORDER — LINAGLIPTIN 5 MG/1
5 TABLET, FILM COATED ORAL DAILY
COMMUNITY
Start: 2023-01-17

## 2023-01-25 RX ORDER — CETIRIZINE HYDROCHLORIDE 10 MG/1
10 TABLET ORAL DAILY
COMMUNITY
Start: 2023-01-17

## 2023-01-25 RX ORDER — LIDOCAINE HYDROCHLORIDE 10 MG/ML
9 INJECTION INFILTRATION; PERINEURAL ONCE
Status: COMPLETED | OUTPATIENT
Start: 2023-01-25 | End: 2023-01-25

## 2023-01-25 RX ORDER — AMMONIUM LACTATE 12 G/100G
LOTION TOPICAL
COMMUNITY
Start: 2022-11-08

## 2023-01-25 RX ORDER — NYSTATIN 100000 U/G
CREAM TOPICAL
COMMUNITY
Start: 2022-11-29

## 2023-01-25 RX ORDER — PEN NEEDLE, DIABETIC 31 GX5/16"
NEEDLE, DISPOSABLE MISCELLANEOUS
COMMUNITY
Start: 2023-01-06

## 2023-01-25 RX ORDER — SITAGLIPTIN 50 MG/1
TABLET, FILM COATED ORAL
COMMUNITY
Start: 2023-01-08

## 2023-01-25 RX ORDER — SEMAGLUTIDE 1.34 MG/ML
INJECTION, SOLUTION SUBCUTANEOUS
COMMUNITY
Start: 2022-08-30

## 2023-01-25 RX ORDER — IRBESARTAN 150 MG/1
150 TABLET ORAL DAILY
COMMUNITY
Start: 2023-01-02

## 2023-01-25 RX ORDER — BLOOD SUGAR DIAGNOSTIC
STRIP MISCELLANEOUS
COMMUNITY
Start: 2022-12-30

## 2023-01-25 RX ORDER — FUROSEMIDE 40 MG/1
40 TABLET ORAL 2 TIMES DAILY
COMMUNITY
Start: 2022-11-27

## 2023-01-25 RX ORDER — INSULIN LISPRO 100 [IU]/ML
INJECTION, SUSPENSION SUBCUTANEOUS
COMMUNITY
Start: 2023-01-07

## 2023-01-25 RX ORDER — POLYETHYLENE GLYCOL 3350 17 G/17G
POWDER, FOR SOLUTION ORAL
COMMUNITY
Start: 2022-11-30

## 2023-01-25 RX ORDER — TRIAMCINOLONE ACETONIDE 1 MG/G
CREAM TOPICAL
COMMUNITY
Start: 2023-01-17

## 2023-01-25 RX ORDER — GABAPENTIN 300 MG/1
CAPSULE ORAL
COMMUNITY
Start: 2022-11-09

## 2023-01-25 RX ORDER — DICLOFENAC SODIUM 10 MG/G
GEL TOPICAL
COMMUNITY
Start: 2023-01-04

## 2023-01-25 RX ADMIN — LIDOCAINE HYDROCHLORIDE 9 ML: 10 INJECTION INFILTRATION; PERINEURAL at 15:14

## 2023-01-25 RX ADMIN — LIDOCAINE HYDROCHLORIDE 9 ML: 10 INJECTION INFILTRATION; PERINEURAL at 14:54

## 2023-01-25 RX ADMIN — TRIAMCINOLONE ACETONIDE 40 MG: 40 INJECTION, SUSPENSION INTRA-ARTICULAR; INTRAMUSCULAR at 15:14

## 2023-01-25 RX ADMIN — TRIAMCINOLONE ACETONIDE 40 MG: 40 INJECTION, SUSPENSION INTRA-ARTICULAR; INTRAMUSCULAR at 14:54

## 2023-01-25 NOTE — LETTER
1/26/2023    Patient: Hernesto Mcallister   YOB: 1960   Date of Visit: 1/25/2023     Nargis Ibarra PA-C  [address] [place] [address]  Via Fax: [phone]    Dear Nargis Ibarra PA-C,      Thank you for referring Mr. Hernesto Mcallister to [address] [place] [address] for evaluation. My notes for this consultation are attached. If you have questions, please do not hesitate to call me. I look forward to following your patient along with you.       Sincerely,    Richard Franco MD Hernia  Painful  US today  Confirm Fat containing   Consider referral to surgery

## 2023-01-25 NOTE — LETTER
Jarad Ivan Shoulders   1960   [id number]       1/25/2023       I hereby authorize and direct Dimitrios Winter MD, Dottie Diggs, and whomever he may designate as his associate to perform upon myself the following procedure:    Injection of: Kenalog, lt knee/lt hip     If any unforeseen condition arises in the course of the procedure, I further authorize him and his associated and/or assistant(s) to do whatever he/she deems advisable. The nature, purpose, benefits, risks, side effects, likelihood of achieving goals, and potential problems that might occur during recuperation, risks for not receiving the proposed care, treatment and services and alternatives of the procedure have been fully explained to me by my physician including, but not limited to:    Swelling, joint pain, skin pigment changes, worsening of condition, and failure to improve. I acknowledge that no guarantee or assurance has been made to me as to the results that may be obtained or the likelihood of success.                 _______________________________________     Signature of patient or authorized representative                [employer] [facility] fax: [phone]

## 2023-01-25 NOTE — PROGRESS NOTES
Name: Rekha Odom    : 1960     Service Dept: [address] [place] [address] and Sports Medicine    Chief Complaint   Patient presents with    Hip Pain    Leg Pain    Knee Pain        Visit Vitals  Ht 5' 8\" (1.727 m)   Wt 265 lb (120.2 kg)   BMI 40.29 kg/m²        No Known Allergies     Current Outpatient Medications   Medication Sig Dispense Refill    acetaminophen (TYLENOL) 500 mg tablet TAKE 1-2 TABS EVERY 6 HOURS AS NEEDED JOINT PAINS      ammonium lactate (LAC-HYDRIN) 12 % lotion APPLY TO AFFECTED AREA ON HANDS TWICE A DAY AS NEEDED RASH      OneTouch Ultra Test strip CHECK BLOOD SUGAR TWICE A DAY E11.9      cetirizine (ZYRTEC) 10 mg tablet Take 10 mg by mouth daily. diclofenac (VOLTAREN) 1 % gel APPLY 4 GRAMS TO LEFT KNEE 4 TIMES A DAY AS NEEDED FOR PAIN**MAX IS 16 GRAMSJOINT/DAY      furosemide (LASIX) 40 mg tablet Take 40 mg by mouth two (2) times a day.      gabapentin (NEURONTIN) 300 mg capsule TAKE 1 CAPSULE BY MOUTH TWICE A DAY FOR NERVE PAIN      HumaLOG Mix 75-25 KwikPen flexpen SUBCUTANEOUS 49 UNITS UNDER THE SKIN IN AM 45 UNITS EVERY PM      irbesartan (AVAPRO) 150 mg tablet Take 150 mg by mouth daily. Tradjenta 5 mg tablet Take 5 mg by mouth daily. nystatin (MYCOSTATIN) topical cream APPLY TO GENITALIA RASH TWICE A DAY      BD Ultra-Fine Short Pen Needle 31 gauge x \" ndle USE ONCE WEEKLY WITH OZEMPIC      Gavilax 17 gram/dose powder MIX 1 CAP FULL IN 8 OZ OF LIQUID TWICE DAILY AS NEEDED FOR CONSTIPATION      semaglutide (Ozempic) 1 mg/dose (4 mg/3 mL) pnij INJECT 1MG UNDER SKIN ONCE A WEEK ON SAME DAY      Januvia 50 mg tablet TAKE 1 TABLET BY MOUTH EVERY DAY FOR DIABETES      triamcinolone acetonide (KENALOG) 0.1 % topical cream TOPICAL APPLY TWICE A DAY TO RASH AS NEEDED ITCHING      clotrimazole-betamethasone (Lotrisone) topical cream Apply  to affected area two (2) times a day.  Apply to affected area 15 g 0    terbinafine HCL (LAMISIL) 250 mg tablet Take 1 Tab by mouth daily. 15 Tab 1    insulin NPH/insulin regular (NOVOLIN 70/30, HUMULIN 70/30) 100 unit/mL (70-30) injection 49 Units by SubCUTAneous route Every morning. insulin NPH/insulin regular (NOVOLIN 70/30, HUMULIN 70/30) 100 unit/mL (70-30) injection 45 Units by SubCUTAneous route every evening. potassium chloride (K-DUR, KLOR-CON) 20 mEq tablet Take  by mouth two (2) times a day. There is no problem list on file for this patient. Family History   Family history unknown: Yes      Social History     Socioeconomic History    Marital status: SINGLE   Tobacco Use    Smoking status: Never    Smokeless tobacco: Never   Vaping Use    Vaping Use: Never used   Substance and Sexual Activity    Alcohol use: Yes     Comment: Occasionally    Drug use: Not Currently    Sexual activity: Not Currently      Past Surgical History:   Procedure Laterality Date    HX HIP REPLACEMENT      HX ORTHOPAEDIC        Past Medical History:   Diagnosis Date    Chronic kidney disease     Diabetes ([facility] [address]     Hypertension         I have reviewed and agree with [address] [place] [address] Nw and ROS and intake form in chart and the record furthermore I have reviewed prior medical record(s) regarding this patients care during this appointment. Review of Systems:   Patient is a pleasant appearing individual, appropriately dressed, well hydrated, well nourished, who is alert, appropriately oriented for age, and in no acute distress with a walker gait and normal affect who does not appear to be in any significant pain.       Physical Exam:  Left Knee -Decrease range of motion with flexion, Knee arc of greater than 50 degrees, Some crepitation, Grossly neurovascularly intact, Good cap refill, No skin lesion, Moderate swelling, No gross instability, Some quadriceps weakness, greater than 50 degree arc    Right Knee - Full Range of Motion, No crepitation, Grossly neurovascularly intact, Good cap refill, No skin lesion, No swelling, No gross instability, No quadriceps weakness    Left Hip - Slight decrease range of motion, No crepitation, Grossly neurovascularly intact, Good cap refill, No skin lesion, mild swelling, No gross instability, Some weakness, No groin pain, Point tenderness on the greater trochanter. Right Hip - Normal range of motion, No crepitation, Grossly neurovascularly intact, Good cap refill, No skin lesion, mild swelling, No gross instability, No weakness, No groin pain, No point tenderness on the greater trochanter. Procedure Documentation:    I discussed in detail the risks, benefits and complications of an injection which included but are not limited to infection, skin reactions, hot swollen joint, and anaphylaxis with the patient. The patient verbalized understanding and gave informed consent for the injection. The patient's left hip was prepped using sterile alcohol solution. A sterile needle was inserted into the left hip and the mixture of 9 mL Lidocaine 1%, 1 mL Kenalog 40 mg was injected under sterile technique. The needle was withdrawn and the puncture site sealed with a Band-Aid. Technique: Under sterile conditions a [employer] ultrasound unit with a variable frequency (7.0-14.0 MHz) linear transducer was used to localize the placement of needle into the left hip. Findings: Successful needle placement for hip injection. Final images were taken and saved for permanent record. The patient tolerated the injection well. The patient was instructed to call the office immediately if there is any pain, redness, warmth, fever, or chills. Procedure Documentation:    I discussed in detail the risks, benefits and complications of an injection which included but are not limited to infection, skin reactions, hot swollen joint, and anaphylaxis with the patient. The patient verbalized understanding and gave informed consent for the injection. The patient's knee was flexed to 90° and the skin prepped using sterile alcohol solution.  A sterile needle was inserted into the left knee and the mixture of 9 mL Lidocaine 1%, 1 mL Kenalog 40 mg was injected under sterile technique. The needle was withdrawn and the puncture site sealed with a Band-Aid. Technique: Under sterile conditions a [employer] ultrasound unit with a variable frequency (7.0-14.0 MHz) linear transducer was used to localize the placement of needle into the left knee joint. Findings: Successful needle placement for knee injection. Final images were taken and saved for permanent record. The patient tolerated the injection well. The patient was instructed to call the office immediately if there is any pain, redness, warmth, fever, or chills. Encounter Diagnoses     ICD-10-CM ICD-9-CM   1. Left knee pain, unspecified chronicity  M25.562 719.46   2. Primary osteoarthritis of left knee  M17.12 715.16   3. Depression, unspecified depression type  F32. A 311   4. Left hip pain  M25.552 719.45   5. Greater trochanteric bursitis of left hip  M70.62 726.5       HPI:  The patient is here with a chief complaint of left hip and left knee pain, throbbing, burning pain. Pain is 7/10. X-rays are unremarkable. Assessment/Plan:  Plan at this point, cortisone injection in the left hip bursal region, left knee for moderate osteoarthritis. We will also put him on physical therapy for both. Of note, he did put in the depression screening higher numbers that triggered questions. I asked him if he felt depressed, and he sometimes does. He is not actively taking any medication. We are going to assist him in getting an appointment with his primary care doctor for depression evaluation. I did tell him to keep an eye on his sugars in the meantime, and we will give him a shot today and we will see him back p.r.n. As part of continued conservative pain management options the patient was advised to utilize Tylenol or OTC NSAIDS as long as it is not medically contraindicated.      Return to Office: Follow-up and Dispositions    Return if symptoms worsen or fail to improve. Administrations This Visit       lidocaine (XYLOCAINE) 10 mg/mL (1 %) injection 9 mL       Admin Date  01/25/2023 Action  Given Dose  9 mL Route  Other Administered By  Philip Segundo              triamcinolone acetonide (KENALOG-40) 40 mg/mL injection 40 mg       Admin Date  01/25/2023 Action  Given Dose  40 mg Route  Intra artICUlar Administered By  Philip Segundo                   Scribed by Mery Israel as dictated by Caden Pratt. Tobias Campbell MD.  Documentation, performed by, True and Accepted Dimitrios Campbell MD

## 2023-01-25 NOTE — PATIENT INSTRUCTIONS

## 2023-02-17 ENCOUNTER — APPOINTMENT (OUTPATIENT)
Dept: PHYSICAL THERAPY | Age: 63
End: 2023-02-17
Payer: MEDICARE

## 2023-02-20 ENCOUNTER — HOSPITAL ENCOUNTER (OUTPATIENT)
Dept: PHYSICAL THERAPY | Age: 63
Discharge: HOME | End: 2023-02-20
Payer: MEDICARE

## 2023-02-20 PROCEDURE — 97162 PT EVAL MOD COMPLEX 30 MIN: CPT

## 2023-02-20 NOTE — THERAPY EVALUATION
[address]  [place], [place]  Ph: [phone]    Fax: [phone]    Plan of Care/Statement of Necessity for Physical Therapy Services  2-15    Patient name: Mayra Herrmann  : 1960  Provider#: [id number]  Referral source: Riaz Salmon MD      Medical/Treatment Diagnosis: Left hip pain [M25.552]  Greater trochanteric bursitis of left hip [M70.62]  Left knee pain, unspecified chronicity [M25.562]  Primary osteoarthritis of left knee [M17.12]     Prior Hospitalization: see medical history     Comorbidities: see medical history  Prior Level of Function: Independent with all ADL's; primary use of rollator for mobility  Medications: Verified on Patient Summary List  Start of Care: 2023      Onset Date: chronic history   The Plan of Care and following information is based on the information from the initial evaluation. Assessment/ key information:   Pt presents to outpatient physical therapy with worsening left hip and knee pain after a chronic history including 2 L hip replacements. Current deficits include decreased ROM, decreased strength, impaired gait, impaired balance, and increased pain and tenderness to palpation. He will likely benefit from skilled physical therapy services in order to address these deficits so that he can return to at or near his prior level of function.     Evaluation Complexity History HIGH Complexity :3+ comorbidities / personal factors will impact the outcome/ POC ; Examination MEDIUM Complexity : 3 Standardized tests and measures addressing body structure, function, activity limitation and / or participation in recreation  ;Presentation LOW Complexity : Stable, uncomplicated  ;Clinical Decision Making TUG Score:  18 seconds  Overall Complexity Rating: MEDIUM    Problem List: pain affecting function, decrease ROM, decrease strength, edema affecting function, impaired gait/ balance, decrease ADL/ functional abilitiies, decrease activity tolerance, decrease flexibility/ joint mobility, and decrease transfer abilities   Treatment Plan may include any combination of the following: Therapeutic exercise, Neuromuscular reeducation, Manual therapy, Therapeutic activity, Electric stim unattended , Vasopneumatic device, Gait training, Ultrasound, and Mechanical traction  Patient / Family readiness to learn indicated by: interest  Persons(s) to be included in education: patient (P)  Barriers to Learning/Limitations: None  Patient Goal (s): to be able to walk without pain  Patient Self Reported Health Status: poor  Rehabilitation Potential: fair    Short Term Goals: To be accomplished in 5 treatments. 1)  Pt to be independent with HEP  2)  Pt to improve L hip flexion to no less than 90 degrees so he can perform toilet or low surface height transfer without pain beyond 5/10    Long Term Goals: To be accomplished in 10 treatments. 1)  Pt to improve L hip strength to no less than 4/5 so that he can negotiate stairs without pain beyond 4/10  2)  Pt to improve L hip abduction by 10 degrees so to decrease trendelenburg during gait cycle so he can walk without pain beyond 4/10    Frequency / Duration: Patient to be seen 2 times per week for 10 treatments. Patient/ Caregiver education and instruction: activity modification and exercises    [x]  Plan of care has been reviewed with PTA        Certification Period: 2/20/2023 to 5/18/2023    Steve Giles PT, DPT 2/20/2023     ________________________________________________________________________    I certify that the above Therapy Services are being furnished while the patient is under my care. I agree with the treatment plan and certify that this therapy is necessary.     [de-identified] Signature:____________________  Date:____________Time: _________                                        Luann Osler, MD  Please sign and return to:   [facility]  [address] [place] [address] [place]  Ph: [phone]    Fax: [phone]    Patient name: Shelly Soni  : 1960  Provider#: [id number]

## 2023-02-20 NOTE — THERAPY EVALUATION
PT INITIAL EVALUATION NOTE - [facility] -15    Patient Name: hBaskar Andersen  Date:2023  : 1960  [x]  Patient  Verified  Payor: Fiordaliza Spencey / Plan: [address] [place] [address] HMO / Product Type: Managed Care Medicare /    In time: 9869  Out time: 1500  Total Treatment Time (min): 40  Total Timed Codes (min): 0  1:1 Treatment Time ([address] only): 40   Visit #: 1    Treatment Area: Left hip pain [M25.552]  Greater trochanteric bursitis of left hip [M70.62]  Left knee pain, unspecified chronicity [M25.562]  Primary osteoarthritis of left knee [M17.12]    SUBJECTIVE  Pain Level (0-10 scale): 10/10  Any medication changes, allergies to medications, adverse drug reactions, diagnosis change, or new procedure performed?: [] No    [x] Yes (see summary sheet for update)  Subjective:    Pt reports a intermittent history of left hip and knee pain that dates back several years. He reports he had a fall in 2021 coming out of a store and he noted that his pain became worse at that time. He reports that his L hip and knee pain have been worsening since that time. He reports that at this time the pain has become constant in his L hip and knee for several months now. He saw Dr. Jeanette Victoria and received an injection in both the knee and hip on the left side 2 weeks ago. He states that the shots seemed to help on the day that he got them. He states that by the next day the pain had returned as it was before and has not gotten any better since that time. He reports he has been restricted to walking with a rollator since the time of the fall in  due to the pain. He also reports significant history of L STEPHANIE x 2, with the most recent being in  but he does not recall for sure. He states that he just wants to be able to walk without pain.      PLOF: Independent with all ADL's; primary use of rollator for mobility  Mechanism of Injury: falls  Previous Treatment/Compliance: pt has received home health PT prior  Radiographs: Pt reports recent X-rays of the knee and hip on the left showed degenerative OA   What increases symptoms: walking  What decreases symptoms: rest  Work Hx: pt is unemployed  Living Situation: lives alone  Pt Goals: to get pain releif  Barriers: chronic history  Motivation: Good  Substance use: None reported  Cognition: A&O x 4  Fall Assessment: TUG Test: 18 seconds  Past Medical History:  Past Medical History:   Diagnosis Date    Chronic kidney disease     Diabetes ([address]     Hypertension      Past Surgical History:  Past Surgical History:   Procedure Laterality Date    HX HIP REPLACEMENT      HX ORTHOPAEDIC       Current Medications:  Current Outpatient Medications   Medication Instructions    acetaminophen (TYLENOL) 500 mg tablet TAKE 1-2 TABS EVERY 6 HOURS AS NEEDED JOINT PAINS    ammonium lactate (LAC-HYDRIN) 12 % lotion APPLY TO AFFECTED AREA ON HANDS TWICE A DAY AS NEEDED RASH    BD Ultra-Fine Short Pen Needle 31 gauge x 5/16\" ndle USE ONCE WEEKLY WITH OZEMPIC    cetirizine (ZYRTEC) 10 mg, Oral, DAILY    clotrimazole-betamethasone (Lotrisone) topical cream Topical, 2 TIMES DAILY, Apply to affected area    diclofenac (VOLTAREN) 1 % gel APPLY 4 GRAMS TO LEFT KNEE 4 TIMES A DAY AS NEEDED FOR PAIN**MAX IS 16 GRAMSJOINT/DAY    furosemide (LASIX) 40 mg, Oral, 2 TIMES DAILY    gabapentin (NEURONTIN) 300 mg capsule TAKE 1 CAPSULE BY MOUTH TWICE A DAY FOR NERVE PAIN    Gavilax 17 gram/dose powder MIX 1 CAP FULL IN 8 OZ OF LIQUID TWICE DAILY AS NEEDED FOR CONSTIPATION    HumaLOG Mix 75-25 KwikPen flexpen SUBCUTANEOUS 49 UNITS UNDER THE SKIN IN AM 45 UNITS EVERY PM    insulin NPH/insulin regular (NOVOLIN 70/30, HUMULIN 70/30) 100 unit/mL (70-30) injection 49 Units, SubCUTAneous, EVERY MORNING    insulin NPH/insulin regular (NOVOLIN 70/30, HUMULIN 70/30) 100 unit/mL (70-30) injection 45 Units, SubCUTAneous, EVERY EVENING    irbesartan (AVAPRO) 150 mg, Oral, DAILY    Januvia 50 mg tablet TAKE 1 TABLET BY MOUTH EVERY DAY FOR DIABETES    nystatin (MYCOSTATIN) topical cream APPLY TO GENITALIA RASH TWICE A DAY    OneTouch Ultra Test strip CHECK BLOOD SUGAR TWICE A DAY E11.9    potassium chloride (K-DUR, KLOR-CON) 20 mEq tablet Oral, 2 TIMES DAILY    semaglutide (Ozempic) 1 mg/dose (4 mg/3 mL) pnij INJECT 1MG UNDER SKIN ONCE A WEEK ON SAME DAY    terbinafine HCL (LAMISIL) 250 mg, Oral, DAILY    Tradjenta 5 mg, Oral, DAILY    triamcinolone acetonide (KENALOG) 0.1 % topical cream TOPICAL APPLY TWICE A DAY TO RASH AS NEEDED ITCHING        Allergies:  No Known Allergies    OBJECTIVE/EXAMINATION  Posture:  decreased lumbar lordosis  Gait and Functional Mobility:  antalgic bilaterally  Palpation: severe palpatory tenderness left lateral aspect of hip    Hip:  Strength AROM     Left Left    Flexion 3/5 54    Extension 4-/5 -7    Abduction 3+/5 10    Adduction 4/5     IR 4+/5     ER 4+/5    Knee:  Strength AROM     Left Left    Flexion 5/5 102    Extension 5/5 -3   Ankle  Strength AROM     Left Left    Dorsiflexion 5/5     Plantarflexion 5/5    *All strength measures are on a scale with 5 as a maximum, if a space is left blank it was not tested. All ROM measurements are measured in degrees. All AROM measurements taken with patient in lying position.     Joint Mobility Assessment: pt has significant declines in L hip AROM  Neurological: Reflexes / Sensations: WFL    Special Tests: Trendelenberg: pos    Stork: neg      Maxwell: pos     Fish: pos                 H.S. SLR: pos    Piriformis Ext: neg      Long Sit: neg     Hip Scour: pos      With   [] TE   [] TA   [] neuro   [] other: Patient Education: [] Provided HEP    [] Progressed/Changed HEP based on:   [] positioning   [] body mechanics   [] transfers   [] heat/ice application    [] other:              Pain Level (0-10 scale) post treatment: 10/10    ASSESSMENT/Changes in Function:   [x]  See Plan of [address] Vesna Shine PT, DPT 2/20/2023

## 2023-02-22 ENCOUNTER — OFFICE VISIT (OUTPATIENT)
Age: 63
End: 2023-02-22

## 2023-02-22 VITALS — BODY MASS INDEX: 40.16 KG/M2 | HEIGHT: 68 IN | WEIGHT: 265 LBS

## 2023-02-22 DIAGNOSIS — M17.12 PRIMARY OSTEOARTHRITIS OF LEFT KNEE: Primary | ICD-10-CM

## 2023-02-22 RX ORDER — NYSTATIN 100000 U/G
CREAM TOPICAL
COMMUNITY
Start: 2022-11-29

## 2023-02-22 RX ORDER — FUROSEMIDE 40 MG/1
TABLET ORAL
COMMUNITY
Start: 2023-02-16

## 2023-02-22 RX ORDER — IRBESARTAN 150 MG/1
TABLET ORAL
COMMUNITY
Start: 2023-01-02

## 2023-02-22 RX ORDER — CETIRIZINE HYDROCHLORIDE 10 MG/1
TABLET ORAL
COMMUNITY
Start: 2023-01-17

## 2023-02-22 RX ORDER — PSEUDOEPHED/ACETAMINOPH/DIPHEN 30MG-500MG
TABLET ORAL
COMMUNITY
Start: 2022-11-29

## 2023-02-22 RX ORDER — GABAPENTIN 300 MG/1
CAPSULE ORAL
COMMUNITY
Start: 2023-02-02

## 2023-02-22 RX ORDER — SITAGLIPTIN 50 MG/1
TABLET, FILM COATED ORAL
COMMUNITY
Start: 2023-02-17

## 2023-02-22 RX ORDER — INSULIN LISPRO 100 [IU]/ML
INJECTION, SUSPENSION SUBCUTANEOUS
COMMUNITY
Start: 2023-01-07

## 2023-02-22 RX ORDER — BLOOD SUGAR DIAGNOSTIC
STRIP MISCELLANEOUS
COMMUNITY
Start: 2022-12-30

## 2023-02-22 RX ORDER — PEN NEEDLE, DIABETIC 31 GX5/16"
NEEDLE, DISPOSABLE MISCELLANEOUS
COMMUNITY
Start: 2023-01-06

## 2023-02-22 RX ORDER — LINAGLIPTIN 5 MG/1
TABLET, FILM COATED ORAL
COMMUNITY
Start: 2023-01-17

## 2023-02-22 NOTE — PATIENT INSTRUCTIONS

## 2023-02-22 NOTE — PROGRESS NOTES
Name: Dionte Salamanca    : 1960     [address] [facility]  [facility] [address]  \[facility]\"" [address] [id number]  Dept: [phone]  Dept Fax: [phone]     Chief Complaint   Patient presents with    Knee Pain        Ht 5' 8\" (1.727 m)   Wt 265 lb (120.2 kg)   BMI 40.29 kg/m²      No Known Allergies     Current Outpatient Medications   Medication Sig Dispense Refill    ACETAMINOPHEN EXTRA STRENGTH 500 MG tablet TAKE 1-2 TABS EVERY 6 HOURS AS NEEDED JOINT PAINS      cetirizine (ZYRTEC) 10 MG tablet TAKE 1 TABLET BY MOUTH EVERY DAY      diclofenac sodium (VOLTAREN) 1 % GEL APPLY 4 GRAMS TO LEFT KNEE 4 TIMES A DAY AS NEEDED FOR PAIN**MAX IS 16 GRAMSJOINT/DAY      furosemide (LASIX) 40 MG tablet       gabapentin (NEURONTIN) 300 MG capsule TAKE 1 CAPSULE BY MOUTH 4 TIMES A DAY FOR NERVE PAIN**DOSE INCREASE      ONETOUCH ULTRA strip CHECK BLOOD SUGAR TWICE A DAY E11.9      HUMALOG MIX 75/25 KWIKPEN (75-25) 100 UNIT/ML SUPN injection pen SUBCUTANEOUS 49 UNITS UNDER THE SKIN IN AM 45 UNITS EVERY PM      B-D ULTRAFINE III SHORT PEN 31G X 8 MM MISC USE ONCE WEEKLY WITH OZEMPIC      irbesartan (AVAPRO) 150 MG tablet TAKE 1 TABLET BY MOUTH EVERY DAY      TRADJENTA 5 MG tablet TAKE 1 TABLET BY MOUTH EVERY DAY      nystatin (MYCOSTATIN) 278366 UNIT/GM cream APPLY TO GENITALIA RASH TWICE A DAY      JANUVIA 50 MG tablet        No current facility-administered medications for this visit. There is no problem list on file for this patient. History reviewed. No pertinent family history.    Social History     Socioeconomic History    Marital status: Single     Spouse name: None    Number of children: None    Years of education: None    Highest education level: None   Tobacco Use    Smoking status: Never    Smokeless tobacco: Never   Substance and Sexual Activity    Alcohol use: Yes    Drug use: Not Currently      Past Surgical History:   Procedure Laterality Date ORTHOPEDIC SURGERY      TOTAL HIP ARTHROPLASTY        Past Medical History:   Diagnosis Date    Chronic kidney disease     Diabetes [address] [address]     Hypertension         I have reviewed and agree with [address] and ROS and intake form in chart and the record furthermore I have reviewed prior medical record(s) regarding this patients care during this appointment. Review of Systems:   Patient is a pleasant appearing individual, appropriately dressed, well hydrated, well nourished, who is alert, appropriately oriented for age, and in no acute distress with a normal gait and normal affect who does not appear to be in any significant pain. Physical Exam:  Left Knee -Decrease range of motion with flexion, Knee arc of greater than 50 degrees, Some crepitation, Grossly neurovascularly intact, Good cap refill, No skin lesion, Moderate swelling, No gross instability, Some quadriceps weakness, greater than 50 degree arc    Right Knee - Full Range of Motion, No crepitation, Grossly neurovascularly intact, Good cap refill, No skin lesion, No swelling, No gross instability, No quadriceps weakness       ICD-10-CM    1. Primary osteoarthritis of left knee  M17.12            HPI:  The patient is here with left knee pain, throbbing, burning pain, progressively getting worse. Pain is 10/10. Post injection feeling better, but not completely resolved. X-rays of the left knee were positive for moderate OA. Assessment/Plan:  Plan at this point, activities as tolerated, weightbearing started. We will see him back as needed, Voltaren gel, and we will go from there. As part of continued conservative pain management options the patient was advised to utilize Tylenol or OTC NSAIDS as long as it is not medically contraindicated. Return to Office: Follow-up and Dispositions    Return if symptoms worsen or fail to improve. Scribed by Fili Palmer MA as dictated by [facility] Burrell MD.  Documentation, performed by, True and Accepted Murphy Lowry MD

## 2023-03-02 ENCOUNTER — HOSPITAL ENCOUNTER (OUTPATIENT)
Dept: PHYSICAL THERAPY | Age: 63
Discharge: HOME | End: 2023-03-02
Payer: MEDICARE

## 2023-03-02 PROCEDURE — 97110 THERAPEUTIC EXERCISES: CPT

## 2023-03-02 NOTE — PROGRESS NOTES
PT DAILY TREATMENT NOTE - [facility] 2-15    Patient Name: Sophia Roland  Date:3/2/2023  : 1960  [x]  Patient  Verified  Payor: Leander Rose / Plan: [address] [place] [address] HMO / Product Type: Managed Care Medicare /    In [id number]  Out time: 1007  Total Treatment Time (min): 72  Total Timed Codes (min): 57  1:1 Treatment Time ( only): 62   Visit #:  2 of 10    Treatment Area: Left hip pain [M25.552]  Greater trochanteric bursitis of left hip [M70.62]  Left knee pain, unspecified chronicity [M25.562]  Primary osteoarthritis of left knee [M17.12]    SUBJECTIVE  Pain Level (0-10 scale): 10/10 DPT verified with VAS pt reports of 10/10 pain  Any medication changes, allergies to medications, adverse drug reactions, diagnosis change, or new procedure performed?: [x] No    [] Yes (see summary sheet for update)  Subjective functional status/changes:     \"I tried to rub that blue ice on it this morning. \"    OBJECTIVE  Modality rationale: decrease edema, decrease inflammation, decrease pain, and increase tissue extensibility to improve the patients ability to improve function   Min Type Additional Details    [] Estim: []Att   []Unatt        []TENS instruct                  []IFC  []Premod   []NMES                     []Other:  []w/US   []w/ice   []w/heat  Position:  Location:    []  Traction: [] Cervical       []Lumbar                       [] Prone          []Supine                       []Intermittent   []Continuous Lbs:  [] before manual  [] after manual  [] With heat  [] Simultaneously performed with E-Stim    []  Ultrasound: []Continuous   [] Pulsed                           []1MHz   []3MHz Location:  W/cm2:   15 [x]  Ice     []  heat  []  Ice massage Position: seated  Location: left hip    []  Vasopneumatic Device  If using vaso (only need to measure limb vaso being performed on)      pre-treatment girth :       post-treatment girth :       measured at (landmark location)  Pressure:       [] lo [] med [] hi Temperature: [] lo [] med [] Hi    [] With ice    [] Simultaneously performed with Estim   [x] Skin assessment post-treatment:  [x]intact [x]redness- no adverse reaction       []redness - adverse reaction:     57 min Therapeutic Exercise:  [x] See flow sheet :   Rationale: increase ROM, increase strength, improve coordination, improve balance, and increase proprioception to improve the patients ability to improve function    With   [x] TE   [] TA   [] neuro   [] other: Patient Education: [x] Review HEP    [] Progressed/Changed HEP based on:   [] positioning   [] body mechanics   [] transfers   [] heat/ice application    [] other:      Other Objective/Functional Measures: addition of exercises as noted on flow sheet    Pain Level (0-10 scale) post treatment: 7/10    ASSESSMENT/Changes in Function:   The pt tolerated treatment well today. Initiation of exercise program to address hip and knee pain and deficits. Pt with reports of high pain, however able to complete all exercises as requested of him. Focused on bed exercises initially due to high reports of pain, however added in a few standing exercises for positional changes. Patient will continue to benefit from skilled PT services to modify and progress therapeutic interventions, address functional mobility deficits, address ROM deficits, address strength deficits, analyze and address soft tissue restrictions, analyze and cue movement patterns, analyze and modify body mechanics/ergonomics, assess and modify postural abnormalities, and address imbalance/dizziness to attain remaining goals. [x]  See Plan of Care  []  See progress note/recertification  []  See Discharge Summary         Progress towards goals / Updated goals:  Short Term Goals: To be accomplished in 5 treatments.   1)  Pt to be independent with HEP  2)  Pt to improve L hip flexion to no less than 90 degrees so he can perform toilet or low surface height transfer without pain beyond 5/10 Long Term Goals: To be accomplished in 10 treatments.   1)  Pt to improve L hip strength to no less than 4/5 so that he can negotiate stairs without pain beyond 4/10  2)  Pt to improve L hip abduction by 10 degrees so to decrease trendelenburg during gait cycle so he can walk without pain beyond 4/10       PLAN  [x]  Upgrade activities as tolerated     [x]  Continue plan of care  []  Update interventions per flow sheet       []  Discharge due to:_  []  Other:_      Stephanie Hamilton, PT, DPT  3/2/2023

## 2023-03-07 ENCOUNTER — HOSPITAL ENCOUNTER (OUTPATIENT)
Dept: PHYSICAL THERAPY | Age: 63
Discharge: HOME | End: 2023-03-07
Payer: MEDICARE

## 2023-03-07 PROCEDURE — 97110 THERAPEUTIC EXERCISES: CPT

## 2023-03-07 PROCEDURE — 97016 VASOPNEUMATIC DEVICE THERAPY: CPT

## 2023-03-07 NOTE — PROGRESS NOTES
PT DAILY TREATMENT NOTE - [facility] 2-15    Patient Name: Thad Chaudhary  Date:3/7/2023  : 1960  [x]  Patient  Verified  Payor: Francis Aiken / Plan: [address] [place] [address] HMO / Product Type: Managed Care Medicare /    In time:9:00 AM  Out time:9:55 AM  Total Treatment Time (min): 55  Total Timed Codes (min): 45  1:1 Treatment Time ( [address] only): 39   Visit #:  3 of 10    Treatment Area: Left hip pain [M25.552]  Greater trochanteric bursitis of left hip [M70.62]  Left knee pain, unspecified chronicity [M25.562]  Primary osteoarthritis of left knee [M17.12]    SUBJECTIVE  Pain Level (0-10 scale): 8/10  Any medication changes, allergies to medications, adverse drug reactions, diagnosis change, or new procedure performed?: [x] No    [] Yes (see summary sheet for update)  Subjective functional status/changes: \"My hip and knee have bothered me for awhile. \"    OBJECTIVE  Modality rationale: decrease inflammation and decrease pain to improve the patients ability to increase L hip flexion to no less than 90 degrees so he can perform toilet or low surface height transfer without pain beyond 5/10   Min Type Additional Details    [] Estim: []Att   []Unatt        []TENS instruct                  []IFC  []Premod   []NMES                     []Other:  []w/US   []w/ice   []w/heat  Position:  Location:    []  Traction: [] Cervical       []Lumbar                       [] Prone          []Supine                       []Intermittent   []Continuous Lbs:  [] before manual  [] after manual  [] With heat  [] Simultaneously performed with E-Stim    []  Ultrasound: []Continuous   [] Pulsed                           []1MHz   []3MHz Location:  W/cm2:    []  Ice     []  heat  []  Ice massage Position:  Location:   10 [x]  Vasopneumatic Device  If using vaso (only need to measure limb vaso being performed on)      pre-treatment girth : 43.5 cm      post-treatment girth : 41.0 cm      measured at (landmark location) mid-patella Pressure:       [x] lo [] med [] hi   Temperature: [x] lo [] med [] Hi    [x] With ice    [] Simultaneously performed with Estim   [x] Skin assessment post-treatment:  [x]intact [x]redness- no adverse reaction       []redness - adverse reaction:     45 min Therapeutic Exercise:  [x] See flow sheet :   Rationale: increase ROM, increase strength, and improve coordination to improve the patients ability to   Increase L hip flexion to no less than 90 degrees so he can perform toilet or low surface height transfer without pain beyond 5/10. With   [x] TE   [] TA   [] neuro   [] other: Patient Education: [x] Review HEP    [] Progressed/Changed HEP based on:   [] positioning   [] body mechanics   [] transfers   [] heat/ice application    [] other:          Pain Level (0-10 scale) post treatment: 7/10    ASSESSMENT/Changes in Function:   The pt tolerated treatment ambulates with increased hip flexion with rolling walker. Patient has decreased range of motion of both hip and knees. Exercises performed today for knee range of motion in sitting position and hip exercises in supine position. Patient will continue to benefit from skilled PT services to address ROM deficits, address strength deficits, analyze and address soft tissue restrictions, and analyze and cue movement patterns to attain remaining goals. [x]  See Plan of Care  []  See progress note/recertification  []  See Discharge Summary         Progress towards goals / Updated goals:  Short Term Goals: To be accomplished in 5 treatments. 1)  Pt to be independent with HEP  2)  Pt to improve L hip flexion to no less than 90 degrees so he can perform toilet or low surface height transfer without pain beyond 5/10     Long Term Goals: To be accomplished in 10 treatments.   1)  Pt to improve L hip strength to no less than 4/5 so that he can negotiate stairs without pain beyond 4/10  2)  Pt to improve L hip abduction by 10 degrees so to decrease trendelenburg during gait cycle so he can walk without pain beyond 4/10    PLAN  [x]  Upgrade activities as tolerated     [x]  Continue plan of care  []  Update interventions per flow sheet       []  Discharge due to:_  []  Other:_      Brian Villeda, PT,  3/7/2023

## 2023-03-09 ENCOUNTER — HOSPITAL ENCOUNTER (OUTPATIENT)
Dept: PHYSICAL THERAPY | Age: 63
Discharge: HOME | End: 2023-03-09
Payer: MEDICARE

## 2023-03-09 PROCEDURE — 97016 VASOPNEUMATIC DEVICE THERAPY: CPT

## 2023-03-09 PROCEDURE — 97150 GROUP THERAPEUTIC PROCEDURES: CPT

## 2023-03-09 PROCEDURE — 97110 THERAPEUTIC EXERCISES: CPT

## 2023-03-09 NOTE — PROGRESS NOTES
PT DAILY TREATMENT NOTE - [facility] 2-15    Patient Name: Brady Navarrete  Date:3/9/2023  : 1960  [x]  Patient  Verified  Payor: Erica Daya / Plan: Irineo Christianson HMO / Product Type: Managed Care Medicare /    In time:857 am  Out time:1002 am  Total Treatment Time (min): 65  Total Timed Codes (min): 33  1:1 Treatment Time ( only): 33   Visit #:  4 of 10/(30 days)    Treatment Area: Left hip pain [M25.552]  Greater trochanteric bursitis of left hip [M70.62]  Left knee pain, unspecified chronicity [M25.562]  Primary osteoarthritis of left knee [M17.12]    SUBJECTIVE  Pain Level (0-10 scale): 7/10  Any medication changes, allergies to medications, adverse drug reactions, diagnosis change, or new procedure performed?: [x] No    [] Yes (see summary sheet for update)  Subjective functional status/changes: \"Pt reports that he has an appointment for a check  up today. Gina Ramirez \"    OBJECTIVE  Modality rationale: decrease edema, decrease inflammation, decrease pain, and increase tissue extensibility to improve the patients ability to increase active hip and knee moiton    Min Type Additional Details    [] Estim: []Att   []Unatt        []TENS instruct                  []IFC  []Premod   []NMES                     []Other:  []w/US   []w/ice   []w/heat  Position:  Location:    []  Traction: [] Cervical       []Lumbar                       [] Prone          []Supine                       []Intermittent   []Continuous Lbs:  [] before manual  [] after manual  [] With heat  [] Simultaneously performed with E-Stim    []  Ultrasound: []Continuous   [] Pulsed                           []1MHz   []3MHz Location:  W/cm2:    []  Ice     []  heat  []  Ice massage Position:  Location:   10 [x]  Vasopneumatic Device  If using vaso (only need to measure limb vaso being performed on)      pre-treatment girth :       post-treatment girth :       measured at (landmark location)  Pressure:       [] lo [x] med [] hi   Temperature: [x] lo [] med [] Hi    [x] With ice  38  [] Simultaneously performed with Estim   [x] Skin assessment post-treatment:  [x]intact [x]redness- no adverse reaction       []redness - adverse reaction:     33 min Therapeutic Exercise:  [x] See flow sheet :   Rationale: increase ROM, increase strength, and improve coordination to improve the patients ability to increase hip and leg strength and activity level for safer mobility             22 min Group Therapy:  [x] See flow sheet :   Billed while completing ex with another patient present during session with therapist    With   [x] TE   [] TA   [] neuro   [] other: Patient Education: [x] Review HEP    [] Progressed/Changed HEP based on:   [] positioning   [] body mechanics   [] transfers   [] heat/ice application    [] other:      Pain Level (0-10 scale) post treatment: 6/10    ASSESSMENT/Changes in Function:   The pt tolerated treatment working on increasing L hip and knee motion and strength with attention to ER and posture correction. Pt needs cue to correct posture and in turn correct leg position. Game ready on hip post ex. Patient will continue to benefit from skilled PT services to modify and progress therapeutic interventions, address functional mobility deficits, address ROM deficits, address strength deficits, and analyze and address soft tissue restrictions to attain remaining goals. [x]  See Plan of Care  []  See progress note/recertification  []  See Discharge Summary         Progress towards goals / Updated goals:  Short Term Goals: To be accomplished in 5 treatments. 1)  Pt to be independent with HEP  2)  Pt to improve L hip flexion to no less than 90 degrees so he can perform toilet or low surface height transfer without pain beyond 5/10     Long Term Goals: To be accomplished in 10 treatments.   1)  Pt to improve L hip strength to no less than 4/5 so that he can negotiate stairs without pain beyond 4/10  2)  Pt to improve L hip abduction by 10 degrees so to decrease trendelenburg during gait cycle so he can walk without pain beyond 4/10    PLAN  [x]  Upgrade activities as tolerated     [x]  Continue plan of care  []  Update interventions per flow sheet       []  Discharge due to:_  []  Other:_      Rita Conner PTA, KAIN 3/9/2023

## 2023-03-14 ENCOUNTER — HOSPITAL ENCOUNTER (OUTPATIENT)
Dept: PHYSICAL THERAPY | Age: 63
Discharge: HOME | End: 2023-03-14
Payer: MEDICARE

## 2023-03-14 PROCEDURE — 97110 THERAPEUTIC EXERCISES: CPT

## 2023-03-14 NOTE — PROGRESS NOTES
PT DAILY TREATMENT NOTE - [facility] -15    Patient Name: Jay Mathis  Date:3/14/2023  : 1960  [x]  Patient  Verified  Payor: Roula Young / Plan: [address] [place] [address] HMO / Product Type: Managed Care Medicare /    In time:9:15 AM  Out time:10:05 AM  Total Treatment Time (min): 50  Total Timed Codes (min): 40  1:1 Treatment Time ( [address] only): 40   Visit #:  5 of 10    Treatment Area: Left hip pain [M25.552]  Greater trochanteric bursitis of left hip [M70.62]  Left knee pain, unspecified chronicity [M25.562]  Primary osteoarthritis of left knee [M17.12]    SUBJECTIVE  Pain Level (0-10 scale): 0/10  Any medication changes, allergies to medications, adverse drug reactions, diagnosis change, or new procedure performed?: [x] No    [] Yes (see summary sheet for update)  Subjective functional status/changes: \"Doing well this morning. \"    OBJECTIVE  Modality rationale: decrease inflammation and increase tissue extensibility to improve the patients ability to improve L hip abduction by 10 degrees so to decrease trendelenburg during gait cycle so he can walk without pain beyond 4/10   Min Type Additional Details    [] Estim: []Att   []Unatt        []TENS instruct                  []IFC  []Premod   []NMES                     []Other:  []w/US   []w/ice   []w/heat  Position:  Location:    []  Traction: [] Cervical       []Lumbar                       [] Prone          []Supine                       []Intermittent   []Continuous Lbs:  [] before manual  [] after manual  [] With heat  [] Simultaneously performed with E-Stim    []  Ultrasound: []Continuous   [] Pulsed                           []1MHz   []3MHz Location:  W/cm2:   10 []  Ice     [x]  heat  []  Ice massage Position:sitting  Location: left hip and knee    []  Vasopneumatic Device  If using vaso (only need to measure limb vaso being performed on)      pre-treatment girth :       post-treatment girth :       measured at (landmark location)  Pressure: [] lo [] med [] hi   Temperature: [] lo [] med [] Hi    [] With ice    [] Simultaneously performed with Estim   [x] Skin assessment post-treatment:  [x]intact [x]redness- no adverse reaction       []redness - adverse reaction:     40 min Therapeutic Exercise:  [x] See flow sheet :   Rationale: increase ROM and increase strength to improve the patients ability to improve L hip flexion to no less than 90 degrees so he can perform toilet or low surface height transfer without pain beyond 5/10        With   [x] TE   [] TA   [] neuro   [] other: Patient Education: [x] Review HEP    [] Progressed/Changed HEP based on:   [] positioning   [] body mechanics   [] transfers   [] heat/ice application    [] other:      Pain Level (0-10 scale) post treatment: 0/10    ASSESSMENT/Changes in Function:   The pt tolerated treatment and able to perform exercises for range of motion and strengthening left hip and knee with supervision. Patient ambulates with rollator and will continue to need it for safety. Patient will continue to benefit from skilled PT services to address functional mobility deficits, address ROM deficits, address strength deficits, and analyze and address soft tissue restrictions to attain remaining goals. [x]  See Plan of Care  []  See progress note/recertification  []  See Discharge Summary         Progress towards goals / Updated goals:  Short Term Goals: To be accomplished in 5 treatments. 1)  Pt to be independent with HEP  2)  Pt to improve L hip flexion to no less than 90 degrees so he can perform toilet or low surface height transfer without pain beyond 5/10     Long Term Goals: To be accomplished in 10 treatments.   1)  Pt to improve L hip strength to no less than 4/5 so that he can negotiate stairs without pain beyond 4/10  2)  Pt to improve L hip abduction by 10 degrees so to decrease trendelenburg during gait cycle so he can walk without pain beyond 4/10    PLAN  [x]  Upgrade activities as tolerated [x]  Continue plan of care  []  Update interventions per flow sheet       []  Discharge due to:_  []  Other:_      Enoch Posada, PT,  3/14/2023

## 2023-03-16 ENCOUNTER — HOSPITAL ENCOUNTER (OUTPATIENT)
Dept: PHYSICAL THERAPY | Age: 63
Discharge: HOME | End: 2023-03-16
Payer: MEDICARE

## 2023-03-16 PROCEDURE — 97110 THERAPEUTIC EXERCISES: CPT

## 2023-03-16 NOTE — PROGRESS NOTES
PT DAILY TREATMENT NOTE - [facility] 2-15    Patient Name: Shirin Ruff  Date:3/16/2023  : 1960  [x]  Patient  Verified  Payor: Castillo Grace / Plan: [address] [place] [address] HMO / Product Type: Managed Care Medicare /    In time:9:05 AM  Out time:10:00 AM  Total Treatment Time (min): 55  Total Timed Codes (min): 40  1:1 Treatment Time ( [address] only): 40   Visit #:  6 of 10    Treatment Area: Left hip pain [M25.552]  Greater trochanteric bursitis of left hip [M70.62]  Left knee pain, unspecified chronicity [M25.562]  Primary osteoarthritis of left knee [M17.12]    SUBJECTIVE  Pain Level (0-10 scale): 2/10  Any medication changes, allergies to medications, adverse drug reactions, diagnosis change, or new procedure performed?: [x] No    [] Yes (see summary sheet for update)  Subjective functional status/changes: \"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 \"    OBJECTIVE  Modality rationale: decrease inflammation, decrease pain, and increase tissue extensibility to improve the patients ability to improve L hip flexion to no less than 90 degrees so he can perform toilet or low surface height transfer without pain beyond 5/10   Min Type Additional Details    [] Estim: []Att   []Unatt        []TENS instruct                  []IFC  []Premod   []NMES                     []Other:  []w/US   []w/ice   []w/heat  Position:  Location:    []  Traction: [] Cervical       []Lumbar                       [] Prone          []Supine                       []Intermittent   []Continuous Lbs:  [] before manual  [] after manual  [] With heat  [] Simultaneously performed with E-Stim    []  Ultrasound: []Continuous   [] Pulsed                           []1MHz   []3MHz Location:  W/cm2:   10 []  Ice     [x]  heat  []  Ice massage Position:sitting  Location:left hip and knee    []  Vasopneumatic Device  If using vaso (only need to measure limb vaso being performed on)      pre-treatment girth :       post-treatment girth :       measured at (landmark location)  Pressure:       [] lo [] med [] hi   Temperature: [] lo [] med [] Hi    [] With ice    [] Simultaneously performed with Estim   [x] Skin assessment post-treatment:  [x]intact [x]redness- no adverse reaction       []redness - adverse reaction:     40 min Therapeutic Exercise:  [x] See flow sheet :   Rationale: increase strength, improve coordination, and improve balance to improve the patients ability to improve L hip flexion to no less than 90 degrees so he can perform toilet or low surface height transfer without pain beyond 5/10. With   [x] TE   [] TA   [] neuro   [] other: Patient Education: [x] Review HEP    [] Progressed/Changed HEP based on:   [] positioning   [] body mechanics   [] transfers   [] heat/ice application    [] other:          Pain Level (0-10 scale) post treatment: 0/10    ASSESSMENT/Changes in Function:   The pt tolerated treatment well with supervision. Continued with exercise program to strengthening left LE. Patient has a old left THR and continues to walk in forward flexed position with rolling walker. Patient will continue to benefit from skilled PT services to address functional mobility deficits, address ROM deficits, address strength deficits, analyze and address soft tissue restrictions, and analyze and cue movement patterns to attain remaining goals. [x]  See Plan of Care  []  See progress note/recertification  []  See Discharge Summary           Progress towards goals / Updated goals:  Short Term Goals: To be accomplished in 5 treatments. 1)  Pt to be independent with HEP  2)  Pt to improve L hip flexion to no less than 90 degrees so he can perform toilet or low surface height transfer without pain beyond 5/10     Long Term Goals: To be accomplished in 10 treatments.   1)  Pt to improve L hip strength to no less than 4/5 so that he can negotiate stairs without pain beyond 4/10  2)  Pt to improve L hip abduction by 10 degrees so to decrease trendelenburg during gait cycle so he can walk without pain beyond 4/10    PLAN  [x]  Upgrade activities as tolerated     [x]  Continue plan of care  []  Update interventions per flow sheet       []  Discharge due to:_  []  Other:_      Cherelle Wilkins, PT,  3/16/2023

## 2023-03-20 ENCOUNTER — HOSPITAL ENCOUNTER (OUTPATIENT)
Dept: PHYSICAL THERAPY | Age: 63
Discharge: HOME | End: 2023-03-20
Payer: MEDICARE

## 2023-03-20 PROCEDURE — 97110 THERAPEUTIC EXERCISES: CPT

## 2023-03-20 PROCEDURE — 97016 VASOPNEUMATIC DEVICE THERAPY: CPT

## 2023-03-20 NOTE — PROGRESS NOTES
PT DAILY TREATMENT NOTE - [facility] 2-15    Patient Name: Amol Ham  Date:3/20/2023  : 1960  [x]  Patient  Verified  Payor: Alex Courtney / Plan: [address] [place] [address] HMO / Product Type: Managed Care Medicare /    In [id number]  Out time: 1000  Total Treatment Time (min): 51  Total Timed Codes (min): 51  1:1 Treatment Time ( [address] only): 41   Visit #:  7 of 10    Treatment Area: Left hip pain [M25.552]  Greater trochanteric bursitis of left hip [M70.62]  Left knee pain, unspecified chronicity [M25.562]  Primary osteoarthritis of left knee [M17.12]    SUBJECTIVE  Pain Level (0-10 scale): 0/10  Any medication changes, allergies to medications, adverse drug reactions, diagnosis change, or new procedure performed?: [x] No    [] Yes (see summary sheet for update)  Subjective functional status/changes:     \"I am moving slow this morning because my sugar is low. I haven't eaten anything today. I didn't take the number. \"    OBJECTIVE  Modality rationale: decrease inflammation, decrease pain, and increase tissue extensibility to improve the patients ability to improve L hip flexion to no less than 90 degrees so he can perform toilet or low surface height transfer without pain beyond 5/10   Min Type Additional Details    [] Estim: []Att   []Unatt        []TENS instruct                  []IFC  []Premod   []NMES                     []Other:  []w/US   []w/ice   []w/heat  Position:  Location:    []  Traction: [] Cervical       []Lumbar                       [] Prone          []Supine                       []Intermittent   []Continuous Lbs:  [] before manual  [] after manual  [] With heat  [] Simultaneously performed with E-Stim    []  Ultrasound: []Continuous   [] Pulsed                           []1MHz   []3MHz Location:  W/cm2:   10 []  Ice     [x]  heat  []  Ice massage Position:sitting  Location:left hip and knee   10 [x]  Vasopneumatic Device  If using vaso (only need to measure limb vaso being performed on) pre-treatment girth : 42cm      post-treatment girth : 42cm      measured at (landmark location)  Pressure:       [] lo [x] med [] hi   Temperature: [] lo [] med [] Hi    [] With ice    [] Simultaneously performed with Estim   [x] Skin assessment post-treatment:  [x]intact [x]redness- no adverse reaction       []redness - adverse reaction:     35 min Therapeutic Exercise:  [x] See flow sheet :   Rationale: increase strength, improve coordination, and improve balance to improve the patients ability to improve L hip flexion to no less than 90 degrees so he can perform toilet or low surface height transfer without pain beyond 5/10. With   [x] TE   [] TA   [] neuro   [] other: Patient Education: [x] Review HEP    [] Progressed/Changed HEP based on:   [] positioning   [] body mechanics   [] transfers   [] heat/ice application    [] other:          Pain Level (0-10 scale) post treatment: 0/10    ASSESSMENT/Changes in Function:   The pt tolerated treatment well with supervision. Slow start to treatment secondary to pt reporting his blood sugar is low and signs of sweating prior to any activity. DPT educated pt on effects of exercise on blood sugar, and crackers/juice provided to assist in raising blood sugar. After a rest and food, pt presents with less sweating, reports feeling better and consents to continue with treatment this morning. Pt was educated on communicating any feelings of lightheadedness or other hypoglycemic symptoms to DPT. Addition of standing exercises today to improve overall balance, strength and endurance deficits. Pt tolerated increases well. Patient has a old left THR and continues to walk in forward flexed position with rolling walker.  Patient will continue to benefit from skilled PT services to address functional mobility deficits, address ROM deficits, address strength deficits, analyze and address soft tissue restrictions, and analyze and cue movement patterns to attain remaining goals. [x]  See Plan of Care  []  See progress note/recertification  []  See Discharge Summary           Progress towards goals / Updated goals:  Short Term Goals: To be accomplished in 5 treatments. 1)  Pt to be independent with HEP  2)  Pt to improve L hip flexion to no less than 90 degrees so he can perform toilet or low surface height transfer without pain beyond 5/10     Long Term Goals: To be accomplished in 10 treatments.   1)  Pt to improve L hip strength to no less than 4/5 so that he can negotiate stairs without pain beyond 4/10  2)  Pt to improve L hip abduction by 10 degrees so to decrease trendelenburg during gait cycle so he can walk without pain beyond 4/10    PLAN  [x]  Upgrade activities as tolerated     [x]  Continue plan of care  []  Update interventions per flow sheet       []  Discharge due to:_  []  Other:_      Rush Whitmore, PT, DPT 3/20/2023

## 2023-03-20 NOTE — PROGRESS NOTES
[address]  [place], [place]  Ph: [phone]    Fax: [phone]    Progress Note    Name: Burak Vasquez   : 1960   MD: Elizabeth Palmer MD       Treatment Diagnosis: Left hip pain [M25.552]  Greater trochanteric bursitis of left hip [M70.62]  Left knee pain, unspecified chronicity [M25.562]  Primary osteoarthritis of left knee [M17.12]  Start of Care: 2023    Visits from Start of Care: 7   Missed Visits: 0    Summary of Care / Assessment / Recommendations:   Patient presented to physical therapy with left hip and knee pain, weakness and decreased range of motion limiting all weight bearing activities. Pt has received treatment including range of motion, strengthening, modalities for pain control, balance, and proprioception activities. Pt has demonstrated overall progress in strength and function. Pt continues to present with functional deficits including overall endurance and pain level. Patient likely nearing discharge, however will reassess at next visit due to difficulty assessing progress today with low blood sugar and not feeling well. Pt would benefit from further skilled physical therapy interventions to continue to progress range of motion, strength, and balance, allowing for improved function. Progress Toward Goals:  1)  Pt to be independent with HEP met  2)  Pt to improve L hip flexion to no less than 90 degrees so he can perform toilet or low surface height transfer without pain beyond 5/10 partially met      Long Term Goals: To be accomplished in 10 treatments.   1)  Pt to improve L hip strength to no less than 4/5 so that he can negotiate stairs without pain beyond 4/10 met  2)  Pt to improve L hip abduction by 10 degrees so to decrease trendelenburg during gait cycle so he can walk without pain beyond 4/10 partially met    Recertification Period: 2023 to 2023    Frequency/Duration:  2 treatments per week, for 10 treatments. Trini Kelly, PT, DPT 3/20/2023     ________________________________________________________________________  NOTE TO PHYSICIAN:  Please complete the following and fax to:  [facility]:   Fax: [phone]  . Retain this original for your records. If you are unable to process this request in 24 hours, please contact our office.        ____ I have read the above report and request that my patient continue therapy with the following changes/special instructions:  ____ I have read the above report and request that my patient be discharged from therapy    Physician's Signature:_________________ Date:___________Time:__________

## 2023-03-23 ENCOUNTER — HOSPITAL ENCOUNTER (OUTPATIENT)
Dept: PHYSICAL THERAPY | Age: 63
End: 2023-03-23
Payer: MEDICARE

## 2023-03-23 PROCEDURE — 97110 THERAPEUTIC EXERCISES: CPT

## 2023-03-23 NOTE — THERAPY DISCHARGE
[address]  [place], [place] [address] [place]  Ph: [phone]     Fax: [phone]    Discharge Summary 2-15    Patient name: Madonna Phillips  : 1960  Provider#: [id number]  Referral source: Brooklyn Mckeon MD      Medical/Treatment Diagnosis: Left hip pain [M25.552]  Greater trochanteric bursitis of left hip [M70.62]  Left knee pain, unspecified chronicity [M25.562]  Primary osteoarthritis of left knee [M17.12]     Prior Hospitalization: see medical history     Comorbidities: See Plan of Care  Prior Level of Function: See Plan of Care  Medications: Verified on Patient Summary List    Start of Care: 2023      Onset Date:   Visits from Start of Care: 8   Missed Visits: 0  Reporting Period : 2023 to 3/23/2023    Assessment / Summary of care:   Patient has received skilled physical therapy interventions including strengthening, hip joint mobility, rom, endurance, strengthening, balance, and functional and work related activities. Patient has demonstrated overall progress in strength, rom, pain level, and overall function. When pt initiated therapy, he was a consistent 10/10 reported pain, and is now a 0/0 reported pain. Home exercise program reviewed today with no concerns voiced by patient. Patient to be discharged at this time secondary to all goals met and current level of functional mobility. Thank you for this referral.      Progress towards goals / Updated goals:  Short Term Goals: To be accomplished in 5 treatments. 1)  Pt to be independent with HEP met  2)  Pt to improve L hip flexion to no less than 90 degrees so he can perform toilet or low surface height transfer without pain beyond 5/10 met      Long Term Goals: To be accomplished in 10 treatments.   1)  Pt to improve L hip strength to no less than 4/5 so that he can negotiate stairs without pain beyond 4/10 met  2)  Pt to improve L hip abduction by 10 degrees so to decrease trendelenburg during gait cycle so he can walk without pain beyond 4/10 met      RECOMMENDATIONS:  [x]Discontinue therapy: [x]Patient has reached or is progressing toward set goals     []Patient is non-compliant or has abdicated     []Due to lack of appreciable progress towards set goals     []Other    Clara Vásquez, PT, DPT  3/23/2023

## 2023-03-23 NOTE — PROGRESS NOTES
PT DAILY TREATMENT NOTE - [facility] 2-15    Patient Name: Noelle Win  Date:3/23/2023  : 1960  [x]  Patient  Verified  Payor: Luz Hu / Plan: [address] [place] [address] HMO / Product Type: Managed Care Medicare /    In [id number]  Out time: 0950  Total Treatment Time (min): 30  Total Timed Codes (min): 30  1:1 Treatment Time ( [address] only): 30   Visit #:  8 of 10    Treatment Area: Left hip pain [M25.552]  Greater trochanteric bursitis of left hip [M70.62]  Left knee pain, unspecified chronicity [M25.562]  Primary osteoarthritis of left knee [M17.12]    SUBJECTIVE  Pain Level (0-10 scale): 0/10  Any medication changes, allergies to medications, adverse drug reactions, diagnosis change, or new procedure performed?: [x] No    [] Yes (see summary sheet for update)  Subjective functional status/changes:     \"Sorry I am late. \"    OBJECTIVE  Posture:  decreased lumbar lordosis  Gait and Functional Mobility:  antalgic bilaterally  Palpation: severe palpatory tenderness left lateral aspect of hip     Hip:   Strength AROM       Left Left     Flexion 5/5 54     Extension 5/5 12     Abduction 5/5 18     Adduction 5/5       IR 5/5       ER 5/5     Knee:   Strength AROM       Left Left     Flexion 5/5 116     Extension 5/5 0   Ankle   Strength AROM       Left Left     Dorsiflexion 5/5       Plantarflexion 5/5     *All strength measures are on a scale with 5 as a maximum, if a space is left blank it was not tested. All ROM measurements are measured in degrees. All AROM measurements taken with patient in lying position.      Neurological: Reflexes / Sensations: WFL     Special Tests: Trendelenberg: pos                                       Stork: neg                            Maxwell: pos                                                 Fish: pos                            H.S. SLR: pos                                               Piriformis Ext: neg                            Long Sit: neg Hip Scour: pos      30 min Therapeutic Exercise:  [x] See flow sheet :   Rationale: increase strength, improve coordination, and improve balance to improve the patients ability to improve L hip flexion to no less than 90 degrees so he can perform toilet or low surface height transfer without pain beyond 5/10. With   [x] TE   [] TA   [] neuro   [] other: Patient Education: [x] Review HEP    [] Progressed/Changed HEP based on:   [] positioning   [] body mechanics   [] transfers   [] heat/ice application    [] other:          Pain Level (0-10 scale) post treatment: 0/10    ASSESSMENT/Changes in Function:   Patient has received skilled physical therapy interventions including strengthening, hip joint mobility, rom, endurance, strengthening, balance, and functional and work related activities. Patient has demonstrated overall progress in strength, rom, pain level, and overall function. When pt initiated therapy, he was a consistent 10/10 reported pain, and is now a 0/0 reported pain. Home exercise program reviewed today with no concerns voiced by patient. Patient to be discharged at this time secondary to all goals met and current level of functional mobility. Thank you for this referral.    []  See Plan of Care  []  See progress note/recertification  [x]  See Discharge Summary           Progress towards goals / Updated goals:  Short Term Goals: To be accomplished in 5 treatments. 1)  Pt to be independent with HEP met  2)  Pt to improve L hip flexion to no less than 90 degrees so he can perform toilet or low surface height transfer without pain beyond 5/10 met      Long Term Goals: To be accomplished in 10 treatments.   1)  Pt to improve L hip strength to no less than 4/5 so that he can negotiate stairs without pain beyond 4/10 met  2)  Pt to improve L hip abduction by 10 degrees so to decrease trendelenburg during gait cycle so he can walk without pain beyond 4/10 met    PLAN  []  Upgrade activities as tolerated     []  Continue plan of care  []  Update interventions per flow sheet       [x]  Discharge due to:_ all goals met  []  Other:_      Dilcia Mackenzie, PT, DPT 3/23/2023

## 2023-03-27 ENCOUNTER — APPOINTMENT (OUTPATIENT)
Dept: PHYSICAL THERAPY | Age: 63
End: 2023-03-27
Payer: MEDICARE

## 2023-03-30 ENCOUNTER — APPOINTMENT (OUTPATIENT)
Dept: PHYSICAL THERAPY | Age: 63
End: 2023-03-30
Payer: MEDICARE

## 2023-04-15 ENCOUNTER — HOSPITAL ENCOUNTER (EMERGENCY)
Age: 63
Discharge: HOME | End: 2023-04-15
Attending: EMERGENCY MEDICINE
Payer: MEDICARE

## 2023-04-15 VITALS
HEART RATE: 85 BPM | RESPIRATION RATE: 18 BRPM | BODY MASS INDEX: 42.57 KG/M2 | DIASTOLIC BLOOD PRESSURE: 98 MMHG | HEIGHT: 68 IN | WEIGHT: 280.87 LBS | SYSTOLIC BLOOD PRESSURE: 177 MMHG | OXYGEN SATURATION: 98 % | TEMPERATURE: 98.2 F

## 2023-04-15 DIAGNOSIS — G89.4 CHRONIC PAIN SYNDROME: ICD-10-CM

## 2023-04-15 DIAGNOSIS — M25.561 ARTHRALGIA OF BOTH LOWER LEGS: Primary | ICD-10-CM

## 2023-04-15 DIAGNOSIS — M25.562 ARTHRALGIA OF BOTH LOWER LEGS: Primary | ICD-10-CM

## 2023-04-15 PROCEDURE — 99283 EMERGENCY DEPT VISIT LOW MDM: CPT

## 2023-04-15 PROCEDURE — 74011250637 HC RX REV CODE- 250/637: Performed by: EMERGENCY MEDICINE

## 2023-04-15 RX ORDER — CYCLOBENZAPRINE HCL 10 MG
10 TABLET ORAL
Qty: 20 TABLET | Refills: 0 | Status: SHIPPED | OUTPATIENT
Start: 2023-04-15

## 2023-04-15 RX ORDER — CYCLOBENZAPRINE HCL 10 MG
10 TABLET ORAL
Status: COMPLETED | OUTPATIENT
Start: 2023-04-15 | End: 2023-04-15

## 2023-04-15 RX ADMIN — CYCLOBENZAPRINE 10 MG: 10 TABLET, FILM COATED ORAL at 21:30

## 2023-05-24 RX ORDER — CYCLOBENZAPRINE HCL 10 MG
10 TABLET ORAL 3 TIMES DAILY PRN
COMMUNITY
Start: 2023-04-15

## 2023-06-23 ENCOUNTER — HOSPITAL ENCOUNTER (EMERGENCY)
Facility: HOSPITAL | Age: 63
Discharge: HOME | End: 2023-06-23
Attending: EMERGENCY MEDICINE
Payer: MEDICARE

## 2023-06-23 VITALS
RESPIRATION RATE: 16 BRPM | BODY MASS INDEX: 40.16 KG/M2 | OXYGEN SATURATION: 99 % | DIASTOLIC BLOOD PRESSURE: 90 MMHG | TEMPERATURE: 98.2 F | SYSTOLIC BLOOD PRESSURE: 142 MMHG | HEART RATE: 75 BPM | HEIGHT: 68 IN | WEIGHT: 265 LBS

## 2023-06-23 DIAGNOSIS — N48.89 PENILE PAIN: Primary | ICD-10-CM

## 2023-06-23 LAB
APPEARANCE UR: CLEAR
BACTERIA URNS QL MICRO: NEGATIVE /HPF
BILIRUB UR QL: NEGATIVE
COLOR UR: ABNORMAL
GLUCOSE UR STRIP.AUTO-MCNC: 250 MG/DL
HGB UR QL STRIP: NEGATIVE
KETONES UR QL STRIP.AUTO: NEGATIVE MG/DL
LEUKOCYTE ESTERASE UR QL STRIP.AUTO: NEGATIVE
NITRITE UR QL STRIP.AUTO: NEGATIVE
PH UR STRIP: 7 (ref 5–8)
PROT UR STRIP-MCNC: NEGATIVE MG/DL
RBC #/AREA URNS HPF: ABNORMAL /HPF (ref 0–3)
SP GR UR REFRACTOMETRY: 1.01 (ref 1–1.03)
URINE CULTURE IF INDICATED: ABNORMAL
UROBILINOGEN UR QL STRIP.AUTO: 0.2 EU/DL (ref 0.2–1)
WBC URNS QL MICRO: ABNORMAL /HPF (ref 0–5)

## 2023-06-23 PROCEDURE — 99283 EMERGENCY DEPT VISIT LOW MDM: CPT

## 2023-06-23 PROCEDURE — 81001 URINALYSIS AUTO W/SCOPE: CPT

## 2023-06-23 PROCEDURE — 6370000000 HC RX 637 (ALT 250 FOR IP): Performed by: EMERGENCY MEDICINE

## 2023-06-23 RX ORDER — IBUPROFEN 600 MG/1
600 TABLET ORAL
Status: COMPLETED | OUTPATIENT
Start: 2023-06-23 | End: 2023-06-23

## 2023-06-23 RX ADMIN — IBUPROFEN 600 MG: 600 TABLET, FILM COATED ORAL at 23:04

## 2023-06-23 ASSESSMENT — ENCOUNTER SYMPTOMS
RESPIRATORY NEGATIVE: 1
EYES NEGATIVE: 1
ALLERGIC/IMMUNOLOGIC NEGATIVE: 1
GASTROINTESTINAL NEGATIVE: 1

## 2023-06-23 ASSESSMENT — PAIN - FUNCTIONAL ASSESSMENT
PAIN_FUNCTIONAL_ASSESSMENT: 0-10
PAIN_FUNCTIONAL_ASSESSMENT: 0-10

## 2023-06-23 ASSESSMENT — LIFESTYLE VARIABLES
HOW OFTEN DO YOU HAVE A DRINK CONTAINING ALCOHOL: NEVER
HOW MANY STANDARD DRINKS CONTAINING ALCOHOL DO YOU HAVE ON A TYPICAL DAY: PATIENT DOES NOT DRINK

## 2023-06-23 ASSESSMENT — PAIN SCALES - GENERAL: PAINLEVEL_OUTOF10: 5

## 2023-06-24 NOTE — ED TRIAGE NOTES
Pt states that he has been having penis pain on the left side of tip and when he pees it has an odor and leaves an odor on his clothes as well. Pt states some irritation with urination and groin pain. Pt did have unprotected sex about 3 weeks ago.

## 2023-06-24 NOTE — ED PROVIDER NOTES
HPI  Patient is a 57 YO male presenting with complaint of penile pain and foul smelling urine. No other complaints. Review of Systems   Constitutional: Negative. HENT: Negative. Eyes: Negative. Respiratory: Negative. Cardiovascular: Negative. Negative for chest pain. Gastrointestinal: Negative. Endocrine: Negative. Genitourinary:  Positive for dysuria and penile pain. Negative for penile discharge. Musculoskeletal: Negative. Skin: Negative. Allergic/Immunologic: Negative. Neurological: Negative. Negative for weakness. Hematological: Negative. Psychiatric/Behavioral: Negative. Negative for behavioral problems. All other systems reviewed and are negative. Physical Exam  Vitals and nursing note reviewed. HENT:      Head: Normocephalic and atraumatic. Nose: Nose normal.   Eyes:      Extraocular Movements: Extraocular movements intact. Pupils: Pupils are equal, round, and reactive to light. Cardiovascular:      Rate and Rhythm: Normal rate and regular rhythm. Pulmonary:      Breath sounds: Normal breath sounds. Abdominal:      General: Abdomen is flat. Bowel sounds are normal.      Palpations: Abdomen is soft. Musculoskeletal:         General: Normal range of motion. Cervical back: Normal range of motion and neck supple. Skin:     General: Skin is warm. Neurological:      General: No focal deficit present. Mental Status: He is alert.    Psychiatric:         Mood and Affect: Mood normal.         Mina Garcia MD  06/23/23 [id number]

## 2023-06-24 NOTE — ED NOTES
Discharged home to self. Ambulatory out of ED. VS WDL.  0 s/s acute distress. Respirations even and unlabored. Discharge instructions and follow up care reviewed. Patient receptive and demonstrated knowledge of instruction via teach-back method.         Immanuel Garvey RN  06/23/23 2298

## 2023-08-19 ENCOUNTER — HOSPITAL ENCOUNTER (EMERGENCY)
Facility: HOSPITAL | Age: 63
Discharge: HOME | End: 2023-08-19
Attending: EMERGENCY MEDICINE
Payer: MEDICARE

## 2023-08-19 VITALS
DIASTOLIC BLOOD PRESSURE: 72 MMHG | BODY MASS INDEX: 40.16 KG/M2 | HEART RATE: 76 BPM | OXYGEN SATURATION: 96 % | SYSTOLIC BLOOD PRESSURE: 126 MMHG | WEIGHT: 265 LBS | RESPIRATION RATE: 18 BRPM | TEMPERATURE: 97.8 F | HEIGHT: 68 IN

## 2023-08-19 DIAGNOSIS — B37.49 CANDIDIASIS OF GENITALIA: ICD-10-CM

## 2023-08-19 DIAGNOSIS — N48.1 BALANITIS: Primary | ICD-10-CM

## 2023-08-19 LAB
APPEARANCE UR: CLEAR
BACTERIA URNS QL MICRO: NEGATIVE /HPF
BILIRUB UR QL: NEGATIVE
COLOR UR: ABNORMAL
GLUCOSE BLD STRIP.AUTO-MCNC: 243 MG/DL (ref 65–100)
GLUCOSE UR STRIP.AUTO-MCNC: 250 MG/DL
HGB UR QL STRIP: ABNORMAL
KETONES UR QL STRIP.AUTO: NEGATIVE MG/DL
LEUKOCYTE ESTERASE UR QL STRIP.AUTO: ABNORMAL
NITRITE UR QL STRIP.AUTO: NEGATIVE
PERFORMED BY:: ABNORMAL
PH UR STRIP: 6 (ref 5–8)
PROT UR STRIP-MCNC: 30 MG/DL
RBC #/AREA URNS HPF: ABNORMAL /HPF (ref 0–3)
SP GR UR REFRACTOMETRY: 1.02 (ref 1–1.03)
UROBILINOGEN UR QL STRIP.AUTO: 0.2 EU/DL (ref 0.2–1)
WBC URNS QL MICRO: ABNORMAL /HPF (ref 0–5)

## 2023-08-19 PROCEDURE — 81001 URINALYSIS AUTO W/SCOPE: CPT

## 2023-08-19 PROCEDURE — 82962 GLUCOSE BLOOD TEST: CPT

## 2023-08-19 PROCEDURE — 99283 EMERGENCY DEPT VISIT LOW MDM: CPT

## 2023-08-19 PROCEDURE — 6370000000 HC RX 637 (ALT 250 FOR IP): Performed by: EMERGENCY MEDICINE

## 2023-08-19 RX ORDER — FLUCONAZOLE 100 MG/1
200 TABLET ORAL
Status: COMPLETED | OUTPATIENT
Start: 2023-08-19 | End: 2023-08-19

## 2023-08-19 RX ORDER — NYSTATIN 100000 U/G
OINTMENT TOPICAL
Qty: 30 G | Refills: 0 | Status: SHIPPED | OUTPATIENT
Start: 2023-08-19

## 2023-08-19 RX ORDER — AMOXICILLIN AND CLAVULANATE POTASSIUM 875; 125 MG/1; MG/1
1 TABLET, FILM COATED ORAL 2 TIMES DAILY
Qty: 20 TABLET | Refills: 0 | Status: SHIPPED | OUTPATIENT
Start: 2023-08-19 | End: 2023-08-29

## 2023-08-19 RX ADMIN — FLUCONAZOLE 200 MG: 100 TABLET ORAL at 12:25

## 2023-08-19 ASSESSMENT — PAIN SCALES - GENERAL: PAINLEVEL_OUTOF10: 0

## 2023-08-19 ASSESSMENT — PAIN - FUNCTIONAL ASSESSMENT: PAIN_FUNCTIONAL_ASSESSMENT: 0-10

## 2023-08-19 NOTE — ED NOTES
Pt was educated on and given discharge instructions, understanding was verbalized. Pt ambulated out of ED with all belongings.       [address] VASQUEZ, RN  08/19/23 8917

## 2023-08-19 NOTE — ED PROVIDER NOTES
[facility] EMERGENCY DEPT  EMERGENCY DEPARTMENT HISTORY AND PHYSICAL EXAM      Date: 8/19/2023  Patient Name: Vijaya Ellis  MRN: [id number]  [facility] [facility] 1960  Date of evaluation: 8/19/2023  Provider: Jody Mathis MD   Note Started: 12:20 PM EDT 8/19/23    HISTORY OF PRESENT ILLNESS     Chief Complaint   Patient presents with    Rash       History Provided By: Patient    HPI: Vijaya Ellis is a 58 y.o. male     PAST MEDICAL HISTORY   Past Medical History:  Past Medical History:   Diagnosis Date    Chronic kidney disease     Diabetes ([address])     Hypertension        Past Surgical History:  Past Surgical History:   Procedure Laterality Date    ORTHOPEDIC SURGERY      TOTAL HIP ARTHROPLASTY         Family History:  History reviewed. No pertinent family history.     Social History:  Social History     Tobacco Use    Smoking status: Never    Smokeless tobacco: Never   Substance Use Topics    Alcohol use: Yes    Drug use: Not Currently       Allergies:  No Known Allergies    PCP: Donaldo Boo PA-C    Current Meds:   Current Facility-Administered Medications   Medication Dose Route Frequency Provider Last Rate Last Admin    fluconazole (DIFLUCAN) tablet 200 mg  200 mg Oral NOW Jody Mathis MD         Current Outpatient Medications   Medication Sig Dispense Refill    cyclobenzaprine (FLEXERIL) 10 MG tablet Take 1 tablet by mouth 3 times daily as needed      diclofenac sodium (VOLTAREN) 1 % GEL Apply 2 g topically 2 times daily Apply 2 grams to affected area 4x a day 200 g 8    ACETAMINOPHEN EXTRA STRENGTH 500 MG tablet TAKE 1-2 TABS EVERY 6 HOURS AS NEEDED JOINT PAINS      cetirizine (ZYRTEC) 10 MG tablet TAKE 1 TABLET BY MOUTH EVERY DAY      diclofenac sodium (VOLTAREN) 1 % GEL APPLY 4 GRAMS TO LEFT KNEE 4 TIMES A DAY AS NEEDED FOR PAIN**MAX IS 16 GRAMSJOINT/DAY      furosemide (LASIX) 40 MG tablet       gabapentin (NEURONTIN) 300 MG capsule TAKE 1 CAPSULE BY MOUTH 4 TIMES A DAY FOR NERVE PAIN**DOSE Social Determinants affecting Dx or Tx: None    Smoking Cessation: Not Applicable    PROCEDURES   Unless otherwise noted above, none. Procedures      CRITICAL CARE TIME   Patient does not meet Critical Care Time, 0 minutes    FINAL IMPRESSION   No diagnosis found. DISPOSITION/PLAN   DISPOSITION      Discharge Note: The patient is stable for discharge home. The signs, symptoms, diagnosis, and discharge instructions have been discussed, understanding conveyed, and agreed upon. The patient is to follow up as recommended or return to ER should their symptoms worsen. PATIENT REFERRED TO:  No follow-up provider specified. DISCHARGE MEDICATIONS:     Medication List        CONTINUE taking these medications      B-D ULTRAFINE III SHORT PEN 31G X 8 MM Misc  Generic drug: Insulin Pen Needle            ASK your doctor about these medications      Acetaminophen Extra Strength 500 MG tablet  Generic drug: acetaminophen     cetirizine 10 MG tablet  Commonly known as: ZYRTEC     cyclobenzaprine 10 MG tablet  Commonly known as: FLEXERIL     * diclofenac sodium 1 % Gel  Commonly known as: VOLTAREN     * diclofenac sodium 1 % Gel  Commonly known as: VOLTAREN  Apply 2 g topically 2 times daily Apply 2 grams to affected area 4x a day     furosemide 40 MG tablet  Commonly known as: LASIX     gabapentin 300 MG capsule  Commonly known as: NEURONTIN     HumaLOG Mix 75/25 KwikPen (75-25) 100 UNIT per ML Supn injection pen  Generic drug: insulin lispro protamine & lispro     irbesartan 150 MG tablet  Commonly known as: AVAPRO     Januvia 50 MG tablet  Generic drug: SITagliptin     nystatin 435825 UNIT/GM cream  Commonly known as: MYCOSTATIN     OneTouch Ultra strip  Generic drug: blood glucose test strips     Tradjenta 5 MG tablet  Generic drug: linagliptin           * This list has 2 medication(s) that are the same as other medications prescribed for you.  Read the directions carefully, and ask your doctor or other care

## 2024-02-25 ENCOUNTER — HOSPITAL ENCOUNTER (EMERGENCY)
Facility: HOSPITAL | Age: 64
Discharge: HOME | End: 2024-02-25
Attending: EMERGENCY MEDICINE
Payer: MEDICARE

## 2024-02-25 VITALS
HEART RATE: 82 BPM | WEIGHT: 265 LBS | HEIGHT: 68 IN | BODY MASS INDEX: 40.16 KG/M2 | OXYGEN SATURATION: 100 % | SYSTOLIC BLOOD PRESSURE: 131 MMHG | DIASTOLIC BLOOD PRESSURE: 76 MMHG | RESPIRATION RATE: 16 BRPM | TEMPERATURE: 99.7 F

## 2024-02-25 DIAGNOSIS — J06.9 VIRAL URI WITH COUGH: Primary | ICD-10-CM

## 2024-02-25 LAB
FLUAV RNA SPEC QL NAA+PROBE: NOT DETECTED
FLUBV RNA SPEC QL NAA+PROBE: NOT DETECTED
SARS-COV-2 RNA RESP QL NAA+PROBE: NOT DETECTED

## 2024-02-25 PROCEDURE — 6370000000 HC RX 637 (ALT 250 FOR IP): Performed by: EMERGENCY MEDICINE

## 2024-02-25 PROCEDURE — 99283 EMERGENCY DEPT VISIT LOW MDM: CPT

## 2024-02-25 PROCEDURE — 87636 SARSCOV2 & INF A&B AMP PRB: CPT

## 2024-02-25 RX ORDER — IBUPROFEN 800 MG/1
800 TABLET ORAL
Status: COMPLETED | OUTPATIENT
Start: 2024-02-25 | End: 2024-02-25

## 2024-02-25 RX ORDER — BENZONATATE 100 MG/1
100 CAPSULE ORAL 3 TIMES DAILY PRN
Qty: 15 CAPSULE | Refills: 0 | Status: SHIPPED | OUTPATIENT
Start: 2024-02-25 | End: 2024-03-01

## 2024-02-25 RX ORDER — ACETAMINOPHEN 500 MG
500 TABLET ORAL EVERY 6 HOURS PRN
Qty: 40 TABLET | Refills: 1 | Status: SHIPPED | OUTPATIENT
Start: 2024-02-25

## 2024-02-25 RX ADMIN — IBUPROFEN 800 MG: 800 TABLET, FILM COATED ORAL at 16:46

## 2024-02-25 ASSESSMENT — PAIN - FUNCTIONAL ASSESSMENT: PAIN_FUNCTIONAL_ASSESSMENT: NONE - DENIES PAIN

## 2024-02-25 NOTE — ED TRIAGE NOTES
Pt reported cough and sore throat that started a few days ago, pt also with c/o being drained for the last month and a bump on his tongue that is really bothering him

## 2024-02-25 NOTE — ED PROVIDER NOTES
[facility] EMERGENCY DEPT  EMERGENCY DEPARTMENT HISTORY AND PHYSICAL EXAM      Date: 2/25/2024  Patient Name: Barry Singh  MRN: [id number]  YOB: 1960  Date of evaluation: 2/25/2024  Provider: Rhiannon Latif MD   Note Started: 4:03 PM EST 2/25/24    HISTORY OF PRESENT ILLNESS     Chief Complaint   Patient presents with    URI       History Provided By: Patient    HPI: Barry Singh is a 63 y.o. male     PAST MEDICAL HISTORY   Past Medical History:  Past Medical History:   Diagnosis Date    Chronic kidney disease     Diabetes (HCC)     Hypertension        Past Surgical History:  Past Surgical History:   Procedure Laterality Date    ORTHOPEDIC SURGERY      TOTAL HIP ARTHROPLASTY         Family History:  History reviewed. No pertinent family history.    Social History:  Social History     Tobacco Use    Smoking status: Never    Smokeless tobacco: Never   Substance Use Topics    Alcohol use: Yes    Drug use: Not Currently       Allergies:  No Known Allergies    PCP: Darby Guzman PA-C    Current Meds:   No current facility-administered medications for this encounter.     Current Outpatient Medications   Medication Sig Dispense Refill    nystatin (MYCOSTATIN) 657699 UNIT/GM ointment Apply topically 2 times daily. 30 g 0    cyclobenzaprine (FLEXERIL) 10 MG tablet Take 1 tablet by mouth 3 times daily as needed      diclofenac sodium (VOLTAREN) 1 % GEL Apply 2 g topically 2 times daily Apply 2 grams to affected area 4x a day 200 g 8    ACETAMINOPHEN EXTRA STRENGTH 500 MG tablet TAKE 1-2 TABS EVERY 6 HOURS AS NEEDED JOINT PAINS      cetirizine (ZYRTEC) 10 MG tablet TAKE 1 TABLET BY MOUTH EVERY DAY      diclofenac sodium (VOLTAREN) 1 % GEL APPLY 4 GRAMS TO LEFT KNEE 4 TIMES A DAY AS NEEDED FOR PAIN**MAX IS 16 GRAMSJOINT/DAY      furosemide (LASIX) 40 MG tablet       gabapentin (NEURONTIN) 300 MG capsule TAKE 1 CAPSULE BY MOUTH 4 TIMES A DAY FOR NERVE PAIN**DOSE INCREASE      ONETOUCH ULTRA strip CHECK

## 2024-03-14 ENCOUNTER — HOSPITAL ENCOUNTER (EMERGENCY)
Facility: HOSPITAL | Age: 64
Discharge: HOME | End: 2024-03-14
Attending: STUDENT IN AN ORGANIZED HEALTH CARE EDUCATION/TRAINING PROGRAM
Payer: MEDICARE

## 2024-03-14 VITALS
WEIGHT: 265 LBS | SYSTOLIC BLOOD PRESSURE: 129 MMHG | TEMPERATURE: 97.9 F | HEART RATE: 71 BPM | RESPIRATION RATE: 17 BRPM | DIASTOLIC BLOOD PRESSURE: 79 MMHG | HEIGHT: 68 IN | BODY MASS INDEX: 40.16 KG/M2 | OXYGEN SATURATION: 99 %

## 2024-03-14 DIAGNOSIS — R60.0 LEG EDEMA: Primary | ICD-10-CM

## 2024-03-14 LAB
ALBUMIN SERPL-MCNC: 3.6 G/DL (ref 3.5–5)
ALBUMIN/GLOB SERPL: 0.9 (ref 1.1–2.2)
ALP SERPL-CCNC: 108 U/L (ref 45–117)
ALT SERPL-CCNC: 30 U/L (ref 12–78)
ANION GAP SERPL CALC-SCNC: 2 MMOL/L (ref 5–15)
AST SERPL W P-5'-P-CCNC: ABNORMAL U/L (ref 15–37)
BASOPHILS # BLD: 0 K/UL (ref 0–0.1)
BASOPHILS NFR BLD: 0 % (ref 0–1)
BILIRUB SERPL-MCNC: 0.6 MG/DL (ref 0.2–1)
BNP SERPL-MCNC: 377 PG/ML
BUN SERPL-MCNC: 10 MG/DL (ref 6–20)
BUN/CREAT SERPL: 7 (ref 12–20)
CA-I BLD-MCNC: 9 MG/DL (ref 8.5–10.1)
CHLORIDE SERPL-SCNC: 108 MMOL/L (ref 97–108)
CO2 SERPL-SCNC: 30 MMOL/L (ref 21–32)
CREAT SERPL-MCNC: 1.53 MG/DL (ref 0.7–1.3)
DIFFERENTIAL METHOD BLD: NORMAL
EKG ATRIAL RATE: 69 BPM
EKG DIAGNOSIS: NORMAL
EKG P AXIS: -20 DEGREES
EKG P-R INTERVAL: 164 MS
EKG Q-T INTERVAL: 384 MS
EKG QRS DURATION: 74 MS
EKG QTC CALCULATION (BAZETT): 411 MS
EKG R AXIS: 29 DEGREES
EKG T AXIS: 58 DEGREES
EKG VENTRICULAR RATE: 69 BPM
EOSINOPHIL # BLD: 0.1 K/UL (ref 0–0.4)
EOSINOPHIL NFR BLD: 2 % (ref 0–7)
ERYTHROCYTE [DISTWIDTH] IN BLOOD BY AUTOMATED COUNT: 14.3 % (ref 11.5–14.5)
GLOBULIN SER CALC-MCNC: 3.8 G/DL (ref 2–4)
GLUCOSE BLD STRIP.AUTO-MCNC: 142 MG/DL (ref 65–100)
GLUCOSE SERPL-MCNC: 158 MG/DL (ref 65–100)
HCT VFR BLD AUTO: 41.7 % (ref 36.6–50.3)
HGB BLD-MCNC: 14 G/DL (ref 12.1–17)
IMM GRANULOCYTES # BLD AUTO: 0 K/UL (ref 0–0.04)
IMM GRANULOCYTES NFR BLD AUTO: 0 % (ref 0–0.5)
LYMPHOCYTES # BLD: 2.9 K/UL (ref 0.8–3.5)
LYMPHOCYTES NFR BLD: 32 % (ref 12–49)
MCH RBC QN AUTO: 28.9 PG (ref 26–34)
MCHC RBC AUTO-ENTMCNC: 33.6 G/DL (ref 30–36.5)
MCV RBC AUTO: 86.2 FL (ref 80–99)
MONOCYTES # BLD: 0.7 K/UL (ref 0–1)
MONOCYTES NFR BLD: 8 % (ref 5–13)
NEUTS SEG # BLD: 5.1 K/UL (ref 1.8–8)
NEUTS SEG NFR BLD: 58 % (ref 32–75)
NRBC # BLD: 0 K/UL (ref 0–0.01)
NRBC BLD-RTO: 0 PER 100 WBC
PERFORMED BY:: ABNORMAL
PLATELET # BLD AUTO: 227 K/UL (ref 150–400)
PMV BLD AUTO: 11.2 FL (ref 8.9–12.9)
POTASSIUM SERPL-SCNC: ABNORMAL MMOL/L (ref 3.5–5.1)
PROT SERPL-MCNC: 7.4 G/DL (ref 6.4–8.2)
RBC # BLD AUTO: 4.84 M/UL (ref 4.1–5.7)
SODIUM SERPL-SCNC: 140 MMOL/L (ref 136–145)
WBC # BLD AUTO: 8.8 K/UL (ref 4.1–11.1)

## 2024-03-14 PROCEDURE — 96374 THER/PROPH/DIAG INJ IV PUSH: CPT

## 2024-03-14 PROCEDURE — 99284 EMERGENCY DEPT VISIT MOD MDM: CPT

## 2024-03-14 PROCEDURE — 36415 COLL VENOUS BLD VENIPUNCTURE: CPT

## 2024-03-14 PROCEDURE — 85025 COMPLETE CBC W/AUTO DIFF WBC: CPT

## 2024-03-14 PROCEDURE — 82962 GLUCOSE BLOOD TEST: CPT

## 2024-03-14 PROCEDURE — 83880 ASSAY OF NATRIURETIC PEPTIDE: CPT

## 2024-03-14 PROCEDURE — 93005 ELECTROCARDIOGRAM TRACING: CPT | Performed by: STUDENT IN AN ORGANIZED HEALTH CARE EDUCATION/TRAINING PROGRAM

## 2024-03-14 PROCEDURE — 80053 COMPREHEN METABOLIC PANEL: CPT

## 2024-03-14 PROCEDURE — 6360000002 HC RX W HCPCS: Performed by: STUDENT IN AN ORGANIZED HEALTH CARE EDUCATION/TRAINING PROGRAM

## 2024-03-14 RX ORDER — FUROSEMIDE 40 MG/1
40 TABLET ORAL 2 TIMES DAILY
Qty: 14 TABLET | Refills: 0 | Status: SHIPPED | OUTPATIENT
Start: 2024-03-14 | End: 2024-03-21

## 2024-03-14 RX ORDER — FUROSEMIDE 10 MG/ML
40 INJECTION INTRAMUSCULAR; INTRAVENOUS ONCE
Status: COMPLETED | OUTPATIENT
Start: 2024-03-14 | End: 2024-03-14

## 2024-03-14 RX ADMIN — FUROSEMIDE 40 MG: 10 INJECTION, SOLUTION INTRAMUSCULAR; INTRAVENOUS at 13:50

## 2024-03-14 ASSESSMENT — PAIN SCALES - GENERAL
PAINLEVEL_OUTOF10: 0
PAINLEVEL_OUTOF10: 0

## 2024-03-14 ASSESSMENT — LIFESTYLE VARIABLES
HOW MANY STANDARD DRINKS CONTAINING ALCOHOL DO YOU HAVE ON A TYPICAL DAY: PATIENT DOES NOT DRINK
HOW OFTEN DO YOU HAVE A DRINK CONTAINING ALCOHOL: NEVER

## 2024-03-14 ASSESSMENT — PAIN - FUNCTIONAL ASSESSMENT
PAIN_FUNCTIONAL_ASSESSMENT: NONE - DENIES PAIN
PAIN_FUNCTIONAL_ASSESSMENT: 0-10

## 2024-03-14 NOTE — ED PROVIDER NOTES
discharge home. The signs, symptoms, diagnosis, and discharge instructions have been discussed, understanding conveyed, and agreed upon. The patient is to follow up as recommended or return to ER should their symptoms worsen.      PATIENT REFERRED TO:  Darby Guzman PA-C  [address] [place] [address] [phone]    In 1 week  As needed        DISCHARGE MEDICATIONS:     Medication List        CHANGE how you take these medications      * furosemide 40 MG tablet  Commonly known as: LASIX  What changed: Another medication with the same name was added. Make sure you understand how and when to take each.     * furosemide 40 MG tablet  Commonly known as: Lasix  Take 1 tablet by mouth in the morning and 1 tablet in the evening. Do all this for 7 days.  What changed: You were already taking a medication with the same name, and this prescription was added. Make sure you understand how and when to take each.           * This list has 2 medication(s) that are the same as other medications prescribed for you. Read the directions carefully, and ask your doctor or other care provider to review them with you.                CONTINUE taking these medications      B-D ULTRAFINE III SHORT PEN 31G X 8 MM Misc  Generic drug: Insulin Pen Needle            ASK your doctor about these medications      acetaminophen 500 MG tablet  Commonly known as: TYLENOL  Take 1 tablet by mouth every 6 hours as needed for Pain     cetirizine 10 MG tablet  Commonly known as: ZYRTEC     cyclobenzaprine 10 MG tablet  Commonly known as: FLEXERIL     * diclofenac sodium 1 % Gel  Commonly known as: VOLTAREN     * diclofenac sodium 1 % Gel  Commonly known as: VOLTAREN  Apply 2 g topically 2 times daily Apply 2 grams to affected area 4x a day     gabapentin 300 MG capsule  Commonly known as: NEURONTIN     HumaLOG Mix 75/25 KwikPen (75-25) 100 UNIT per ML Supn injection pen  Generic drug: insulin lispro protamine & lispro     irbesartan 150 MG

## 2024-03-14 NOTE — DISCHARGE INSTRUCTIONS
Double your Lasix dose for the next 5 days.  Follow-up with your primary care physician next week.

## 2024-04-25 ENCOUNTER — OFFICE VISIT (OUTPATIENT)
Age: 64
End: 2024-04-25
Payer: MEDICARE

## 2024-04-25 DIAGNOSIS — Z79.4 TYPE 2 DIABETES MELLITUS WITHOUT COMPLICATION, WITH LONG-TERM CURRENT USE OF INSULIN (HCC): Primary | ICD-10-CM

## 2024-04-25 DIAGNOSIS — E11.9 TYPE 2 DIABETES MELLITUS WITHOUT COMPLICATION, WITH LONG-TERM CURRENT USE OF INSULIN (HCC): Primary | ICD-10-CM

## 2024-04-25 PROCEDURE — G0108 DIAB MANAGE TRN  PER INDIV: HCPCS

## 2024-04-25 NOTE — PROGRESS NOTES
Education Curriculum: A Guide to Successful Self-Management (3rd edition)      ANGIE DUKE RN on 4/25/2024 at 1:17 PM    I have personally reviewed the health record, including provider notes, laboratory data and current medications before making these care and education recommendations. The time spent in this effort is included in the total time.  Total minutes: 30            Diabetes Skills, Confidence & Preparedness Index (SCPI) ©    Thank you for completing the Skills, Confidence & Preparedness Index!  Below are your scores.  All scales and questions are out of 7.  If you would like these results emailed, please enter your email address along with some identifying patient information.  Email:  Patient Identifier:      Overall SCPI score: 3.0 Skills Score: 2.0  Low: Healthy Eating(Q1),Taking Medication(Q2),Blood Sugar Monitoring(Q3),Blood Sugar Monitoring(Q4),Reducing Risks(Q5),Problem Solving(Q6),Healthy Coping(Q7),Blood Sugar Monitoring(Q8),Reducing Risks(Q9) Confidence Score: 2.0  Low: Healthy Eating(Q1),Healthy Coping(Q2),Reducing Risks(Q3),Healthy Eating(Q4),Being Active(Q5),Blood Sugar Monitoring(Q6),Problem Solving(Q7) Preparedness Score: 5.4  Low: Taking Medication(Q5)   Healthy Eating Score: 3.0  Low: Skills(Q1),Confidence(Q1),Confidence(Q4) Taking Medication Score: 2.0  Low: Skills(Q2),Preparedness(Q5) Blood Sugar Monitoring Score: 2.8  Low: Skills(Q3),Skills(Q4),Skills(Q8),Confidence(Q6) Reducing Risks Score: 3.0  Low: Skills(Q5),Skills(Q9),Confidence(Q3)   Problem Solving Score: 3.3  Low: Skills(Q6),Confidence(Q7) Healthy Coping Score: 3.3  Low: Skills(Q7),Confidence(Q2) Being Active Score: 4.0  Low: Confidence(Q5)     Skills/Knowledge Questions   1. I know how to plan meals that have the best balance between carbohydrates, proteins and vegetables. 2   2. I know how my diabetes medications (pills, injectables and/or insulin) work in my body. 2   3. I know when to check my blood sugar if I want

## 2024-05-08 ENCOUNTER — NURSE ONLY (OUTPATIENT)
Age: 64
End: 2024-05-08
Payer: MEDICARE

## 2024-05-08 DIAGNOSIS — Z79.4 TYPE 2 DIABETES MELLITUS WITHOUT COMPLICATION, WITH LONG-TERM CURRENT USE OF INSULIN (HCC): Primary | ICD-10-CM

## 2024-05-08 DIAGNOSIS — E11.9 TYPE 2 DIABETES MELLITUS WITHOUT COMPLICATION, WITH LONG-TERM CURRENT USE OF INSULIN (HCC): Primary | ICD-10-CM

## 2024-05-08 PROCEDURE — G0109 DIAB MANAGE TRN IND/GROUP: HCPCS

## 2024-05-09 NOTE — PROGRESS NOTES
[facility]  Diabetes Self-Management Education & Support Program  Encounter Note      SUMMARY  Diabetes self-care management training was completed related to reducing risks. The participant will return on May 15 to continue DSMES related to healthy eating and monitoring. The participant did identify SMART Goal(s) and will practice knowledge and skills related to reducing risks to improve diabetes self-management.        EVALUATION:  Participant was engaged in learning.  He shared that he once had a hypoglycemic event while he was at the store.  He described as feeling shaky, unable to stand, unable to speak.  He states that someone got him some orange juice and he felt better.  He shared that he is up to date on vaccines as well as annual exams for eye, dental and foot.   He identified his personal care team during class.      RECOMMENDATIONS:  Continue DSMES classes  Hypoglycemia kit for emergencies     TOPICS DISCUSSED TODAY:  WHAT IS DIABETES? Minutes: 60  HOW DO I STAY HEALTHY? 60      Next provider visit is scheduled for 5/5/24       SMART GOAL(S)   Contact provider to find out his target for A1c before next class.  ACHIEVEMENT OF GOAL(S) : 0-24%         DATE DSMES TOPIC EVALUATION     5/9/2024 WHAT IS DIABETES?   Role of the normal pancreas in energy balance and blood glucose control   The defect seen in diabetes   Signs & symptoms of diabetes   Diagnosis of diabetes   Types of diabetes   Blood glucose targets in non-pregnant & non-pregnant adults       The participant knows  Their type of diabetes: Yes   The basic physiologic defect: Yes  Blood glucose targets: Yes     DATE DSMES TOPIC EVALUATION     5/9/2024 HOW DO I STAY HEALTHY?   Prevention   Vaccinations   Preconception care (if applicable)  Examinations   Eye    Foot   Diabetic complications' prevention   Dental health   Heart health   Kidney Health   Nerve health   Sleep health      The participant has a personal diabetes

## 2024-05-15 ENCOUNTER — NURSE ONLY (OUTPATIENT)
Age: 64
End: 2024-05-15
Payer: MEDICARE

## 2024-05-15 DIAGNOSIS — E11.9 TYPE 2 DIABETES MELLITUS WITHOUT COMPLICATION, WITHOUT LONG-TERM CURRENT USE OF INSULIN (HCC): Primary | ICD-10-CM

## 2024-05-15 PROCEDURE — G0109 DIAB MANAGE TRN IND/GROUP: HCPCS

## 2024-05-15 NOTE — PROGRESS NOTES
[facility]  Diabetes Self-Management Education & Support Program  Encounter Note      SUMMARY  Diabetes self-care management training was completed related to healthy eating and monitoring. The participant will return on May 22 to continue DSMES related to taking medications and physical activity. The participant did identify SMART Goal(s) and will practice knowledge and skills related to reducing risks and healthy eating and monitoring to improve diabetes self-management.        EVALUATION:  Participant was engaged in learning and shared in group discussions.  He shared that he has trouble putting together the right food.  He verbalized understanding of carbohydrate counting and label reading.  He was able to put together a 45 gm CHO plate for dinner meal without difficulty.  He met his personal SMART goal from last week.    RECOMMENDATIONS:  Continue DSMES classes     TOPICS DISCUSSED TODAY:  WHAT CAN I EAT? 60  HOW CAN BLOOD GLUCOSE MONITORING HELP ME? 60      Next provider visit is scheduled for 5/22/24       SMART GOAL(S)   Make a list of starchy and non starchy veggies over the next 7 days.  ACHIEVEMENT OF GOAL(S) : 0-24%    2.    Contact provider to find out his target for A1c before next class.  (7 or below)  ACHIEVEMENT OF GOAL(S) :  100%         DATE DSMES TOPIC EVALUATION     5/15/2024 WHAT CAN I EAT?   General principles   Determining a healthy weight   Nutritional terms & tools   Healthy Plate method   Carbohydrate Counting   Reading food labels   Free apps   Pregnancy recommendations      The participant   Uses Healthy Plate principles in constructing meals: Yes  Reads food labels in choosing acceptable foods: Yes         DATE DSMES TOPIC EVALUATION     5/15/2024 HOW CAN BLOOD GLUCOSE MONITORING HELP ME?   Value of blood glucose monitoring   Realistic expectations   Blood glucose monitoring targets   Target adjustments   Setting a1c & blood glucose targets with provider

## 2024-05-22 ENCOUNTER — NURSE ONLY (OUTPATIENT)
Age: 64
End: 2024-05-22
Payer: MEDICARE

## 2024-05-22 DIAGNOSIS — E11.9 TYPE 2 DIABETES MELLITUS WITHOUT COMPLICATION, WITHOUT LONG-TERM CURRENT USE OF INSULIN (HCC): Primary | ICD-10-CM

## 2024-05-22 PROCEDURE — G0109 DIAB MANAGE TRN IND/GROUP: HCPCS

## 2024-05-29 ENCOUNTER — NURSE ONLY (OUTPATIENT)
Age: 64
End: 2024-05-29
Payer: MEDICARE

## 2024-05-29 DIAGNOSIS — Z79.4 TYPE 2 DIABETES MELLITUS WITHOUT COMPLICATION, WITH LONG-TERM CURRENT USE OF INSULIN (HCC): Primary | ICD-10-CM

## 2024-05-29 DIAGNOSIS — E11.9 TYPE 2 DIABETES MELLITUS WITHOUT COMPLICATION, WITH LONG-TERM CURRENT USE OF INSULIN (HCC): Primary | ICD-10-CM

## 2024-05-29 PROCEDURE — G0109 DIAB MANAGE TRN IND/GROUP: HCPCS

## 2024-05-30 NOTE — PROGRESS NOTES
[facility]  Diabetes Self-Management Education & Support Program  Encounter Note      SUMMARY  Diabetes self-care management training was completed related to healthy coping and problem solving. The participant will return in 6 weeks to follow up on DSMES related to reducing risks, healthy eating, monitoring, taking medications, physical activity, healthy coping, and problem solving. The participant did not identify SMART Goal(s) and will practice knowledge and skills related to reducing risks, healthy eating and monitoring, being active and medications, and healthy coping and problem solving to improve diabetes self-management.        EVALUATION:  Participant was engaged in learning and shared in group discussions.  He shared that he felt more confident now in managing diabetes.  He shared that he is eating more veggies with meals.  He likes to do deep breathing exercises and physical activity for stress reduction.    He use SOAR problem solving technique in class to solve issue of finding a new provider.  He was able to identify patterns with blood glucose monitoring as well as identify s/s of hypoglycemia and treatment.    RECOMMENDATIONS:  Continue using skills obtained in DSMES classes  6 week follow up     TOPICS DISCUSSED TODAY:  HOW DO I FIND SUPPORT TO TACKLE THIS CONDITION? 60  HOW DO I FIGURE OUT WHAT'S INFLUENCING MY BLOOD GLUCOSES? 60      Next provider visit is scheduled for 7/18/24       SMART GOAL(S)  Contact provider to find out his target for A1c before next class.   ACHIEVEMENT OF GOAL(S) : %    2.   Make a list of starchy and non starchy veggies over the next 7 days.   ACHIEVEMENT OF GOAL(S) :  %    3.     Will inject insulin in the back of his arm , instead of deltoid area , for each insulin injection. Site rotation.   ACHIEVEMENT OF GOAL(S) :  %         DATE DSMES TOPIC EVALUATION     5/30/2024 HOW DO I FIND SUPPORT TO TACKLE THIS CONDITION?

## 2024-07-18 ENCOUNTER — OFFICE VISIT (OUTPATIENT)
Age: 64
End: 2024-07-18

## 2024-07-18 DIAGNOSIS — Z79.4 TYPE 2 DIABETES MELLITUS WITH HYPERGLYCEMIA, WITH LONG-TERM CURRENT USE OF INSULIN (HCC): ICD-10-CM

## 2024-07-18 DIAGNOSIS — Z79.4 TYPE 2 DIABETES MELLITUS WITHOUT COMPLICATION, WITH LONG-TERM CURRENT USE OF INSULIN (HCC): Primary | ICD-10-CM

## 2024-07-18 DIAGNOSIS — E11.9 TYPE 2 DIABETES MELLITUS WITHOUT COMPLICATION, WITH LONG-TERM CURRENT USE OF INSULIN (HCC): Primary | ICD-10-CM

## 2024-07-18 DIAGNOSIS — E11.65 TYPE 2 DIABETES MELLITUS WITH HYPERGLYCEMIA, WITH LONG-TERM CURRENT USE OF INSULIN (HCC): ICD-10-CM

## 2024-07-18 NOTE — PROGRESS NOTES
Barry Singh completed  9  of diabetes self-management education. The participant acquired new knowledge and demonstrated new skills during the program.     The participant worked on the following SMART GOAL(s) to improve their diabetes self-management:   Will start doing pen prime , when doing insulin injection.  ACHIEVEMENT OF GOAL(S) : 0-24%     2.    Will inject insulin in the back of his arm , instead of deltoid area , for each insulin injection. Site rotation.  ACHIEVEMENT OF GOAL(S) :  100%     3.will do chair exercise for 10 minutes 7 days a week.  Patient reminded that he need to have authorization from his provider if he makes  changes in his exercise.    ACHIEVEMENT OF GOAL(S) :  50%    4.  Make a list of starchy and non starchy veggies over the next 7 days.  ACHIEVEMENT OF GOAL(S) : 100%- states by identifying the vegetables was able to eat more vegetables, with his meals.     The participant's pre-program A1c was not available. The post-evaluation A1c is needed.    The participant improved their Diabetes Skills, Confidence and Preparedness Index (scored out of 7):    Total score: 5.7  Skills: 6.0  Confidence: 5.9  Preparedness: 5.1    FINAL RECOMMENDATIONS:  Review rule of 15/15 for treating hypoglycemia  Schedule FLU with provider and obtain A1C  Log FBS daily and 2H past dinner meal     Next provider visit is scheduled for TBD; will need A1C       Rosita Wallis RD on 7/18/2024 at 1:04 PM    Metric Patient's responses (7/18/2024) Areas for improvement     Healthy Eating       The participant is using the Healthy Plate to control carbohydrate intake Yes    The participant reads food labels accurately No          Being Active       The participant performs physical activity where your heart beats faster and your breathing is harder than normal for 30 minutes or more?  5 days    In a typical week, the participant performs physical activity 5 days          Monitoring   The participant is monitoring

## 2024-09-08 ENCOUNTER — HOSPITAL ENCOUNTER (EMERGENCY)
Facility: HOSPITAL | Age: 64
Discharge: HOME | End: 2024-09-08
Attending: EMERGENCY MEDICINE
Payer: MEDICARE

## 2024-09-08 VITALS
TEMPERATURE: 97.2 F | DIASTOLIC BLOOD PRESSURE: 82 MMHG | RESPIRATION RATE: 19 BRPM | OXYGEN SATURATION: 98 % | HEART RATE: 84 BPM | SYSTOLIC BLOOD PRESSURE: 104 MMHG

## 2024-09-08 DIAGNOSIS — J06.9 VIRAL URI WITH COUGH: Primary | ICD-10-CM

## 2024-09-08 PROCEDURE — 99283 EMERGENCY DEPT VISIT LOW MDM: CPT

## 2024-09-08 PROCEDURE — 6370000000 HC RX 637 (ALT 250 FOR IP): Performed by: EMERGENCY MEDICINE

## 2024-09-08 PROCEDURE — 87636 SARSCOV2 & INF A&B AMP PRB: CPT

## 2024-09-08 RX ORDER — IBUPROFEN 600 MG/1
600 TABLET, FILM COATED ORAL
Status: COMPLETED | OUTPATIENT
Start: 2024-09-08 | End: 2024-09-08

## 2024-09-08 RX ADMIN — IBUPROFEN 600 MG: 600 TABLET, FILM COATED ORAL at 13:01

## 2024-09-08 ASSESSMENT — PAIN SCALES - GENERAL: PAINLEVEL_OUTOF10: 10

## 2024-09-08 ASSESSMENT — PAIN - FUNCTIONAL ASSESSMENT: PAIN_FUNCTIONAL_ASSESSMENT: 0-10

## 2024-09-08 ASSESSMENT — PAIN DESCRIPTION - LOCATION: LOCATION: GENERALIZED

## 2024-09-20 ENCOUNTER — HOSPITAL ENCOUNTER (EMERGENCY)
Facility: HOSPITAL | Age: 64
Discharge: HOME | End: 2024-09-20
Attending: EMERGENCY MEDICINE
Payer: MEDICARE

## 2024-09-20 ENCOUNTER — APPOINTMENT (OUTPATIENT)
Facility: HOSPITAL | Age: 64
End: 2024-09-20
Attending: EMERGENCY MEDICINE
Payer: MEDICARE

## 2024-09-20 VITALS
OXYGEN SATURATION: 98 % | DIASTOLIC BLOOD PRESSURE: 87 MMHG | WEIGHT: 265 LBS | BODY MASS INDEX: 40.16 KG/M2 | SYSTOLIC BLOOD PRESSURE: 140 MMHG | RESPIRATION RATE: 17 BRPM | HEIGHT: 68 IN | HEART RATE: 99 BPM | TEMPERATURE: 97.9 F

## 2024-09-20 DIAGNOSIS — M25.512 ACUTE PAIN OF LEFT SHOULDER: Primary | ICD-10-CM

## 2024-09-20 DIAGNOSIS — S46.912A STRAIN OF LEFT SHOULDER, INITIAL ENCOUNTER: ICD-10-CM

## 2024-09-20 PROCEDURE — 73030 X-RAY EXAM OF SHOULDER: CPT

## 2024-09-20 PROCEDURE — 6360000002 HC RX W HCPCS: Performed by: EMERGENCY MEDICINE

## 2024-09-20 PROCEDURE — 96372 THER/PROPH/DIAG INJ SC/IM: CPT

## 2024-09-20 PROCEDURE — 99284 EMERGENCY DEPT VISIT MOD MDM: CPT

## 2024-09-20 RX ORDER — KETOROLAC TROMETHAMINE 15 MG/ML
15 INJECTION, SOLUTION INTRAMUSCULAR; INTRAVENOUS
Status: COMPLETED | OUTPATIENT
Start: 2024-09-20 | End: 2024-09-20

## 2024-09-20 RX ADMIN — KETOROLAC TROMETHAMINE 15 MG: 15 INJECTION, SOLUTION INTRAMUSCULAR; INTRAVENOUS at 09:06

## 2024-09-20 ASSESSMENT — PAIN DESCRIPTION - ORIENTATION: ORIENTATION: LEFT

## 2024-09-20 ASSESSMENT — PAIN DESCRIPTION - FREQUENCY: FREQUENCY: CONTINUOUS

## 2024-09-20 ASSESSMENT — PAIN - FUNCTIONAL ASSESSMENT
PAIN_FUNCTIONAL_ASSESSMENT: 0-10
PAIN_FUNCTIONAL_ASSESSMENT: PREVENTS OR INTERFERES SOME ACTIVE ACTIVITIES AND ADLS

## 2024-09-20 ASSESSMENT — PAIN SCALES - GENERAL: PAINLEVEL_OUTOF10: 10

## 2024-09-20 ASSESSMENT — PAIN DESCRIPTION - DESCRIPTORS: DESCRIPTORS: ACHING

## 2024-09-20 ASSESSMENT — PAIN DESCRIPTION - PAIN TYPE: TYPE: ACUTE PAIN

## 2024-09-20 ASSESSMENT — PAIN DESCRIPTION - ONSET: ONSET: SUDDEN

## 2024-09-20 ASSESSMENT — PAIN DESCRIPTION - LOCATION: LOCATION: SHOULDER

## 2024-10-05 ENCOUNTER — HOSPITAL ENCOUNTER (EMERGENCY)
Facility: HOSPITAL | Age: 64
Discharge: HOME | End: 2024-10-05
Attending: EMERGENCY MEDICINE
Payer: MEDICARE

## 2024-10-05 VITALS
HEART RATE: 82 BPM | TEMPERATURE: 98 F | WEIGHT: 264.1 LBS | SYSTOLIC BLOOD PRESSURE: 130 MMHG | RESPIRATION RATE: 22 BRPM | DIASTOLIC BLOOD PRESSURE: 88 MMHG | BODY MASS INDEX: 40.16 KG/M2 | OXYGEN SATURATION: 94 %

## 2024-10-05 DIAGNOSIS — L98.9 SKIN LESION OF FOOT: ICD-10-CM

## 2024-10-05 DIAGNOSIS — R60.0 LEG EDEMA: Primary | ICD-10-CM

## 2024-10-05 LAB
ALBUMIN SERPL-MCNC: 3.5 G/DL (ref 3.5–5)
ALBUMIN/GLOB SERPL: 0.9 (ref 1.1–2.2)
ALP SERPL-CCNC: 124 U/L (ref 45–117)
ALT SERPL-CCNC: 34 U/L (ref 12–78)
ANION GAP SERPL CALC-SCNC: 9 MMOL/L (ref 2–12)
AST SERPL W P-5'-P-CCNC: 24 U/L (ref 15–37)
BASOPHILS # BLD: 0 K/UL (ref 0–0.1)
BASOPHILS NFR BLD: 1 % (ref 0–1)
BILIRUB SERPL-MCNC: 0.7 MG/DL (ref 0.2–1)
BNP SERPL-MCNC: 305 PG/ML
BUN SERPL-MCNC: 16 MG/DL (ref 6–20)
BUN/CREAT SERPL: 12 (ref 12–20)
CA-I BLD-MCNC: 8.6 MG/DL (ref 8.5–10.1)
CHLORIDE SERPL-SCNC: 103 MMOL/L (ref 97–108)
CO2 SERPL-SCNC: 29 MMOL/L (ref 21–32)
CREAT SERPL-MCNC: 1.36 MG/DL (ref 0.7–1.3)
DIFFERENTIAL METHOD BLD: NORMAL
EOSINOPHIL # BLD: 0.1 K/UL (ref 0–0.4)
EOSINOPHIL NFR BLD: 2 % (ref 0–7)
ERYTHROCYTE [DISTWIDTH] IN BLOOD BY AUTOMATED COUNT: 13.7 % (ref 11.5–14.5)
GLOBULIN SER CALC-MCNC: 4 G/DL (ref 2–4)
GLUCOSE SERPL-MCNC: 210 MG/DL (ref 65–100)
HCT VFR BLD AUTO: 43.6 % (ref 36.6–50.3)
HGB BLD-MCNC: 14.7 G/DL (ref 12.1–17)
IMM GRANULOCYTES # BLD AUTO: 0 K/UL (ref 0–0.04)
IMM GRANULOCYTES NFR BLD AUTO: 0 % (ref 0–0.5)
LYMPHOCYTES # BLD: 1.9 K/UL (ref 0.8–3.5)
LYMPHOCYTES NFR BLD: 32 % (ref 12–49)
MCH RBC QN AUTO: 29.6 PG (ref 26–34)
MCHC RBC AUTO-ENTMCNC: 33.7 G/DL (ref 30–36.5)
MCV RBC AUTO: 87.7 FL (ref 80–99)
MONOCYTES # BLD: 0.6 K/UL (ref 0–1)
MONOCYTES NFR BLD: 10 % (ref 5–13)
NEUTS SEG # BLD: 3.3 K/UL (ref 1.8–8)
NEUTS SEG NFR BLD: 55 % (ref 32–75)
NRBC # BLD: 0 K/UL (ref 0–0.01)
NRBC BLD-RTO: 0 PER 100 WBC
PLATELET # BLD AUTO: 187 K/UL (ref 150–400)
PMV BLD AUTO: 11.3 FL (ref 8.9–12.9)
POTASSIUM SERPL-SCNC: 3.6 MMOL/L (ref 3.5–5.1)
PROT SERPL-MCNC: 7.5 G/DL (ref 6.4–8.2)
RBC # BLD AUTO: 4.97 M/UL (ref 4.1–5.7)
SODIUM SERPL-SCNC: 141 MMOL/L (ref 136–145)
WBC # BLD AUTO: 6 K/UL (ref 4.1–11.1)

## 2024-10-05 PROCEDURE — 6370000000 HC RX 637 (ALT 250 FOR IP): Performed by: EMERGENCY MEDICINE

## 2024-10-05 PROCEDURE — 85025 COMPLETE CBC W/AUTO DIFF WBC: CPT

## 2024-10-05 PROCEDURE — 99284 EMERGENCY DEPT VISIT MOD MDM: CPT

## 2024-10-05 PROCEDURE — 80053 COMPREHEN METABOLIC PANEL: CPT

## 2024-10-05 PROCEDURE — 83880 ASSAY OF NATRIURETIC PEPTIDE: CPT

## 2024-10-05 PROCEDURE — 96374 THER/PROPH/DIAG INJ IV PUSH: CPT

## 2024-10-05 PROCEDURE — 36415 COLL VENOUS BLD VENIPUNCTURE: CPT

## 2024-10-05 PROCEDURE — 6360000002 HC RX W HCPCS: Performed by: EMERGENCY MEDICINE

## 2024-10-05 RX ORDER — BACITRACIN ZINC 500 [USP'U]/G
OINTMENT TOPICAL DAILY
Status: DISCONTINUED | OUTPATIENT
Start: 2024-10-05 | End: 2024-10-05 | Stop reason: HOSPADM

## 2024-10-05 RX ORDER — BACITRACIN ZINC 500 [USP'U]/G
OINTMENT TOPICAL 2 TIMES DAILY
Status: DISCONTINUED | OUTPATIENT
Start: 2024-10-05 | End: 2024-10-05

## 2024-10-05 RX ORDER — FUROSEMIDE 10 MG/ML
40 INJECTION INTRAMUSCULAR; INTRAVENOUS
Status: COMPLETED | OUTPATIENT
Start: 2024-10-05 | End: 2024-10-05

## 2024-10-05 RX ADMIN — BACITRACIN ZINC: 500 OINTMENT TOPICAL at 15:32

## 2024-10-05 RX ADMIN — FUROSEMIDE 40 MG: 10 INJECTION, SOLUTION INTRAMUSCULAR; INTRAVENOUS at 15:12

## 2024-10-05 ASSESSMENT — PAIN DESCRIPTION - ORIENTATION: ORIENTATION: LEFT

## 2024-10-05 ASSESSMENT — PAIN DESCRIPTION - LOCATION: LOCATION: KNEE

## 2024-10-05 ASSESSMENT — PAIN SCALES - GENERAL: PAINLEVEL_OUTOF10: 2

## 2024-10-05 ASSESSMENT — PAIN - FUNCTIONAL ASSESSMENT: PAIN_FUNCTIONAL_ASSESSMENT: 0-10

## 2024-10-05 NOTE — ED TRIAGE NOTES
Patient arrives from home for bilateral, non-pitting leg swelling that began last night. Patient reports swelling began in his feet and extends to his knees. Denies any shortness of breath, reports taking his Lasix appropriately.  Patient also has concerns for a wound to his left 5th digit to his foot that he first noticed today.

## 2024-10-05 NOTE — ED NOTES
Pt given discharge and follow up instructions. Education on wound care given, pt advised to follow up with PCP. No further questions at this time.

## 2024-10-05 NOTE — ED PROVIDER NOTES
[facility] EMERGENCY DEPT  EMERGENCY DEPARTMENT ENCOUNTER      Pt Name: Barry Singh  MRN: [id number]  Birthdate 1960  Date of evaluation: 10/5/2024  Provider: Ramez Craig MD    CHIEF COMPLAINT       Chief Complaint   Patient presents with    Leg Swelling         HISTORY OF PRESENT ILLNESS   (Location/Symptom, Timing/Onset, Context/Setting, Quality, Duration, Modifying Factors, Severity)  Note limiting factors.   Barry Singh is a 64 y.o. male who presents to the emergency department worsening of chronic lower extremity edema over the last 3 to 4 days.  Many year history of lower extremity edema on Lasix 40 mg twice daily chronic renal insufficiency diabetes and hypertension.  Patient denies shortness of breath orthopnea exertional dyspnea.  He states compliance with Lasix though is unsure if he has been taking it twice a day as prescribed.  No chest pain no recent illness.  Also notes sore on his left fifth toe    HPI    Nursing Notes were reviewed.    REVIEW OF SYSTEMS    (2-9 systems for level 4, 10 or more for level 5)     Review of Systems   All other systems reviewed and are negative.      Except as noted above the remainder of the review of systems was reviewed and negative.       PAST MEDICAL HISTORY     Past Medical History:   Diagnosis Date    Chronic kidney disease     Diabetes (HCC)     Hypertension          SURGICAL HISTORY       Past Surgical History:   Procedure Laterality Date    ORTHOPEDIC SURGERY      TOTAL HIP ARTHROPLASTY           CURRENT MEDICATIONS       Previous Medications    ACETAMINOPHEN (TYLENOL) 500 MG TABLET    Take 1 tablet by mouth every 6 hours as needed for Pain    B-D ULTRAFINE III SHORT PEN 31G X 8 MM MISC    USE ONCE WEEKLY WITH OZEMPIC    CETIRIZINE (ZYRTEC) 10 MG TABLET    TAKE 1 TABLET BY MOUTH EVERY DAY    CYCLOBENZAPRINE (FLEXERIL) 10 MG TABLET    Take 1 tablet by mouth 3 times daily as needed    DICLOFENAC SODIUM (VOLTAREN) 1 % GEL    Apply 2 g topically 2 times daily  below, none     Procedures    FINAL IMPRESSION    No diagnosis found.      DISPOSITION/PLAN   DISPOSITION    Condition at Disposition: Data Unavailable      PATIENT REFERRED TO:  No follow-up provider specified.    DISCHARGE MEDICATIONS:  New Prescriptions    No medications on file     Controlled Substances Monitoring:          No data to display                (Please note that portions of this note were completed with a voice recognition program.  Efforts were made to edit the dictations but occasionally words are mis-transcribed.)    Ramez Craig MD (electronically signed)  Attending Emergency Physician           Ramez Craig MD  10/05/24 [id number]

## 2024-10-21 ENCOUNTER — HOSPITAL ENCOUNTER (EMERGENCY)
Facility: HOSPITAL | Age: 64
Discharge: HOME | End: 2024-10-21
Attending: EMERGENCY MEDICINE
Payer: MEDICARE

## 2024-10-21 VITALS
HEIGHT: 68 IN | TEMPERATURE: 98.2 F | OXYGEN SATURATION: 94 % | RESPIRATION RATE: 18 BRPM | BODY MASS INDEX: 40.16 KG/M2 | HEART RATE: 75 BPM | DIASTOLIC BLOOD PRESSURE: 86 MMHG | SYSTOLIC BLOOD PRESSURE: 155 MMHG | WEIGHT: 265 LBS

## 2024-10-21 DIAGNOSIS — M79.89 LEG SWELLING: Primary | ICD-10-CM

## 2024-10-21 PROCEDURE — 99283 EMERGENCY DEPT VISIT LOW MDM: CPT

## 2024-10-21 RX ORDER — FUROSEMIDE 40 MG/1
80 TABLET ORAL 2 TIMES DAILY
Qty: 28 TABLET | Refills: 0 | Status: SHIPPED | OUTPATIENT
Start: 2024-10-21 | End: 2024-10-28

## 2024-10-21 ASSESSMENT — PAIN DESCRIPTION - LOCATION: LOCATION: KNEE

## 2024-10-21 ASSESSMENT — PAIN SCALES - GENERAL
PAINLEVEL_OUTOF10: 3
PAINLEVEL_OUTOF10: 0

## 2024-10-21 ASSESSMENT — PAIN DESCRIPTION - ORIENTATION: ORIENTATION: LEFT

## 2024-10-21 NOTE — DISCHARGE INSTRUCTIONS
You were seen in the ER for your leg swelling and tiredness. You should stop taking gabapentin as this makes you tired and dizzy.    You should take extra fluid pills this week - 2 in the morning and 2 at noon, until you can follow up with your primary provider. Return to the ER for any difficulty breathing, dizziness, or any other new or concerning symptoms.        Thank you for choosing our Emergency Department for your care.  It is our privilege to care for you in your time of need.  In the next several days, you may receive a survey via email or mailed to your home about your experience with our team.  We would greatly appreciate you taking a few minutes to complete the survey, as we use this information to learn what we have done well and what we could be doing better. Thank you for trusting us with your care!    Below you will find a list of your tests from today's visit.   Labs  No results found for this or any previous visit (from the past 12 hour(s)).    Radiologic Studies  No orders to display     ------------------------------------------------------------------------------------------------------------  The evaluation and treatment you received in the Emergency Department were for an urgent problem. It is important that you follow-up with a doctor, nurse practitioner, or physician assistant to:  (1) confirm your diagnosis,  (2) re-evaluation of changes in your illness and treatment, and (3) for ongoing care. Please take your discharge instructions with you when you go to your follow-up appointment.     If you have any problem arranging a follow-up appointment, contact us!  If your symptoms become worse or you do not improve as expected, please return to us. We are available 24 hours a day.     If a prescription has been provided, please fill it as soon as possible to prevent a delay in treatment. If you have any questions or reservations about taking the medication due to side effects or interactions

## 2024-10-21 NOTE — ED TRIAGE NOTES
Patient reports bilateral leg swelling and states that one of his medications re making him sleepy but isn't sure which one  Patient educated about gabapentin side effects during triage MD At bedside

## 2024-10-21 NOTE — ED PROVIDER NOTES
[facility] EMERGENCY DEPT  EMERGENCY DEPARTMENT HISTORY AND PHYSICAL EXAM      Date: 10/21/2024  Patient Name: Barry Singh  MRN: [id number]  Birthdate 1960  Date of evaluation: 10/21/2024  Provider: Loreto Avina MD   Note Started: 9:21 AM EDT 10/21/24    HISTORY OF PRESENT ILLNESS     Chief Complaint   Patient presents with    Leg Swelling    Fatigue       History Provided By: pt    HPI: Barry Singh is a 64 y.o. male with PMH ckd, DM, HTN presenting with leg swelling, fatigue. Pt reports increased edema x1 week. Further states he feels tired after taking one of his medications, noted to be on gabapentin.  States this medication makes him feel dizzy and tired.  Denies dizziness or tiredness otherwise.  Denies chest pain or shortness of breath.  Taking furosemide as prescribed, 40mg tablets x2 in AM and 1 at noon    PAST MEDICAL HISTORY   Past Medical History:  Past Medical History:   Diagnosis Date    Chronic kidney disease     Diabetes (HCC)     Hypertension        Past Surgical History:  Past Surgical History:   Procedure Laterality Date    ORTHOPEDIC SURGERY      TOTAL HIP ARTHROPLASTY         Family History:  History reviewed. No pertinent family history.    Social History:  Social History     Tobacco Use    Smoking status: Never    Smokeless tobacco: Never   Substance Use Topics    Alcohol use: Yes    Drug use: Not Currently       Allergies:  No Known Allergies    PCP: Darby Guzman PA-C    Current Meds:   No current facility-administered medications for this encounter.     Current Outpatient Medications   Medication Sig Dispense Refill    furosemide (LASIX) 40 MG tablet Take 2 tablets by mouth 2 times daily for 7 days Take 2 in the am and 2 at noon until you can see your primary 28 tablet 0    diclofenac sodium (VOLTAREN) 1 % GEL Apply 2 g topically 2 times daily Apply 2 grams to affected area 4x a day 200 g 8    diclofenac sodium (VOLTAREN) 1 % GEL Apply 2 g topically 3 times daily as needed for

## 2024-10-23 ENCOUNTER — HOSPITAL ENCOUNTER (OUTPATIENT)
Facility: HOSPITAL | Age: 64
Setting detail: RECURRING SERIES
Discharge: HOME | End: 2024-10-26
Payer: MEDICARE

## 2024-10-23 ENCOUNTER — TELEPHONE (OUTPATIENT)
Facility: HOSPITAL | Age: 64
End: 2024-10-23

## 2024-10-23 PROCEDURE — 97161 PT EVAL LOW COMPLEX 20 MIN: CPT

## 2024-10-23 NOTE — THERAPY EVALUATION
[facility]  [address] [place] [id number]  Ph: [phone]     Fax: [phone]             PHYSICAL THERAPY - EVALUATION ONLY / DISCHARGE  (updated 3/23)      Date of Service: 10/23/2024        Patient Name:  Barry Singh :  1960   Medical   Diagnosis:  Edema, unspecified [R60.9] Treatment Diagnosis:  R60.9     Edema, unspecified    Referral Source:  Devin Sellers MD Provider #:  [id number]                Insurance: Payor: [employer] MEDICARE / Plan: [employer] GOLD PLUS HMO / Product Type: *No Product type* /      Visit #   Current  / Total 1    Time   In / Out 0818 0848   Total Treatment Time 30   Total Timed Codes 30   [x] Patient's date of birth verified      SUBJECTIVE  Pain Level (0-10 scale): 10/10  Changes in pain since onset: Intermittent    Any medication changes, allergies to medications, adverse drug reactions, diagnosis change, or new procedure performed?: [x] No    [] Yes (see summary sheet for update)  Medications: Verified on Patient Summary List    Subjective functional status/changes:     Patient is 64 y.o. -American male who presents for physical therapy evaluation with bilateral LE swelling. He reports that he has had swelling from time to time for years. He has Type 2 diabetes but not checking it frequently. He does have a sore on his L toe, but it is healing. He went to the hospital on 10/05/2024 for swelling and they helped decreased swelling. He went again a couple days ago, but they just gave him a higher dose of diuretic. He thinks that he will be going back in a couple weeks. He did fall a few weeks ago.     Onset Date: chronic  Start of Care: 10/23/2024  PLOF: Primary use of rollator for mobility   Mechanism of Injury:  chronic  Previous Treatment/Compliance: Medication  Radiographs: None taken  What increases symptoms: being up on his feet  What decreases symptoms: medication  Work Hx:  Patient does not work.  Living

## 2024-10-23 NOTE — TELEPHONE ENCOUNTER
Phone call made to Dr. Irving Sellers 10/23/2024 at 0840 and again at 0900 to discuss patient care for Barry Shoulders. Patient does not want to have unna boots applied. Voicemail was left with MD office and return call was requested.

## 2024-12-01 ENCOUNTER — HOSPITAL ENCOUNTER (EMERGENCY)
Facility: HOSPITAL | Age: 64
Discharge: HOME | End: 2024-12-01
Attending: EMERGENCY MEDICINE
Payer: MEDICARE

## 2024-12-01 VITALS
DIASTOLIC BLOOD PRESSURE: 83 MMHG | OXYGEN SATURATION: 98 % | BODY MASS INDEX: 41.68 KG/M2 | SYSTOLIC BLOOD PRESSURE: 153 MMHG | HEART RATE: 95 BPM | RESPIRATION RATE: 18 BRPM | TEMPERATURE: 98.5 F | WEIGHT: 275 LBS | HEIGHT: 68 IN

## 2024-12-01 DIAGNOSIS — B37.9 YEAST INFECTION: Primary | ICD-10-CM

## 2024-12-01 DIAGNOSIS — E11.65 HYPERGLYCEMIA DUE TO DIABETES MELLITUS (HCC): ICD-10-CM

## 2024-12-01 LAB
APPEARANCE UR: CLEAR
BACTERIA URNS QL MICRO: ABNORMAL /HPF
BILIRUB UR QL: NEGATIVE
COLOR UR: ABNORMAL
GLUCOSE BLD STRIP.AUTO-MCNC: 391 MG/DL (ref 65–100)
GLUCOSE UR STRIP.AUTO-MCNC: >1000 MG/DL
HGB UR QL STRIP: NEGATIVE
KETONES UR QL STRIP.AUTO: NEGATIVE MG/DL
LEUKOCYTE ESTERASE UR QL STRIP.AUTO: NEGATIVE
NITRITE UR QL STRIP.AUTO: NEGATIVE
PERFORMED BY:: ABNORMAL
PH UR STRIP: 6 (ref 5–8)
PROT UR STRIP-MCNC: NEGATIVE MG/DL
RBC #/AREA URNS HPF: ABNORMAL /HPF (ref 0–3)
SP GR UR REFRACTOMETRY: 1.01 (ref 1–1.03)
UROBILINOGEN UR QL STRIP.AUTO: 0.2 EU/DL (ref 0.2–1)
WBC URNS QL MICRO: ABNORMAL /HPF (ref 0–5)

## 2024-12-01 PROCEDURE — 87591 N.GONORRHOEAE DNA AMP PROB: CPT

## 2024-12-01 PROCEDURE — 81001 URINALYSIS AUTO W/SCOPE: CPT

## 2024-12-01 PROCEDURE — 82962 GLUCOSE BLOOD TEST: CPT

## 2024-12-01 PROCEDURE — 6370000000 HC RX 637 (ALT 250 FOR IP): Performed by: EMERGENCY MEDICINE

## 2024-12-01 PROCEDURE — 99283 EMERGENCY DEPT VISIT LOW MDM: CPT

## 2024-12-01 PROCEDURE — 87491 CHLMYD TRACH DNA AMP PROBE: CPT

## 2024-12-01 RX ORDER — NYSTATIN 100000 U/G
OINTMENT TOPICAL
Qty: 30 G | Refills: 0 | Status: SHIPPED | OUTPATIENT
Start: 2024-12-01

## 2024-12-01 RX ORDER — FLUCONAZOLE 150 MG/1
150 TABLET ORAL ONCE
Qty: 1 TABLET | Refills: 0 | Status: SHIPPED | OUTPATIENT
Start: 2024-12-08 | End: 2024-12-08

## 2024-12-01 RX ORDER — FLUCONAZOLE 100 MG/1
200 TABLET ORAL
Status: COMPLETED | OUTPATIENT
Start: 2024-12-01 | End: 2024-12-01

## 2024-12-01 RX ADMIN — FLUCONAZOLE 200 MG: 100 TABLET ORAL at 20:45

## 2024-12-01 ASSESSMENT — PAIN - FUNCTIONAL ASSESSMENT: PAIN_FUNCTIONAL_ASSESSMENT: NONE - DENIES PAIN

## 2024-12-02 NOTE — ED TRIAGE NOTES
Pt states that starting today he has been having left hand itching that has been driving him crazy, but also states that he has penis/groin itching and irritation that started last week. Pt denies discharge and burning/painful urination ATT.

## 2024-12-02 NOTE — DISCHARGE INSTR - COC
Continuity of Care Form    Patient Name: Barry Singh   :  1960  MRN:  [id number]    Admit date:  2024  Discharge date:  ***    Code Status Order: No Order   Advance Directives:   Advance Care Flowsheet Documentation             Admitting Physician:  No admitting provider for patient encounter.  PCP: Darby Guzman PA-C    Discharging Nurse: ***  Discharging Hospital Unit/Room#: ER10/10  Discharging Unit Phone Number: ***    Emergency Contact:   Extended Emergency Contact Information  Primary Emergency Contact: Zoey Rodriguez  Home Phone: [phone]  Mobile Phone: [phone]  Relation: Friend    Past Surgical History:  Past Surgical History:   Procedure Laterality Date    ORTHOPEDIC SURGERY      TOTAL HIP ARTHROPLASTY         Immunization History:   Immunization History   Administered Date(s) Administered    TD 2LF, TDVAX, (age 7y+), IM, 0.5mL 2020, 03/10/2022       Active Problems:  There is no problem list on file for this patient.      Isolation/Infection:   Isolation            No Isolation          Patient Infection Status       None to display                     Nurse Assessment:  Last Vital Signs: BP (!) 153/83   Pulse 95   Temp 98.5 °F (36.9 °C)   Resp 18   Ht 1.727 m (5' 8\")   Wt 124.7 kg (275 lb)   SpO2 98%   BMI 41.81 kg/m²     Last documented pain score (0-10 scale):    Last Weight:   Wt Readings from Last 1 Encounters:   24 124.7 kg (275 lb)     Mental Status:  {IP PT MENTAL STATUS:93156}    IV Access:  { KRISTINA IV ACCESS:777278522}    Nursing Mobility/ADLs:  Walking   {CHP DME ADLs:743110957}  Transfer  {CHP DME ADLs:697426274}  Bathing  {CHP DME ADLs:193395480}  Dressing  {CHP DME ADLs:702447021}  Toileting  {CHP DME ADLs:422786914}  Feeding  {CHP DME ADLs:889266206}  Med Admin  {CHP DME ADLs:972560624}  Med Delivery   { KRISTINA MED Delivery:284382107}    Wound Care Documentation and Therapy:        Elimination:  Continence:   Bowel: {YES / NO:72643}  Bladder:

## 2024-12-02 NOTE — DISCHARGE INSTRUCTIONS
Thank you for choosing our Emergency Department for your care.  It is our privilege to care for you in your time of need.  In the next several days, you may receive a survey via email or mailed to your home about your experience with our team.  We would greatly appreciate you taking a few minutes to complete the survey, as we use this information to learn what we have done well and what we could be doing better. Thank you for trusting us with your care!    Below you will find a list of your tests from today's visit.   Labs  Recent Results (from the past 12 hour(s))   Urinalysis with Microscopic    Collection Time: 12/01/24  8:15 PM   Result Value Ref Range    Color, UA Yellow/Straw      Appearance Clear Clear      Specific Gravity, UA 1.010 1.003 - 1.030      pH, Urine 6.0 5.0 - 8.0      Protein, UA Negative Negative mg/dL    Glucose, Ur >1000 (A) Negative mg/dL    Ketones, Urine Negative Negative mg/dL    Bilirubin, Urine Negative Negative      Blood, Urine Negative Negative      Urobilinogen, Urine 0.2 0.2 - 1.0 EU/dL    Nitrite, Urine Negative Negative      Leukocyte Esterase, Urine Negative Negative      WBC, UA 0-4 0 - 5 /hpf    RBC, UA 0-5 0 - 3 /hpf    BACTERIA, URINE 1+ (A) Negative /hpf   POCT Glucose    Collection Time: 12/01/24  8:20 PM   Result Value Ref Range    POC Glucose 391 (H) 65 - 100 mg/dL    Performed by: RICHARD QUEEN        Radiologic Studies  No orders to display     ------------------------------------------------------------------------------------------------------------  The evaluation and treatment you received in the Emergency Department were for an urgent problem. It is important that you follow-up with a doctor, nurse practitioner, or physician assistant to:  (1) confirm your diagnosis,  (2) re-evaluation of changes in your illness and treatment, and (3) for ongoing care. Please take your discharge instructions with you when you go to your follow-up appointment.     If you have

## 2024-12-02 NOTE — ED PROVIDER NOTES
none  Procedures      CRITICAL CARE TIME   Patient does not meet Critical Care Time, 0 minutes    ED IMPRESSION     1. Yeast infection    2. Hyperglycemia due to diabetes mellitus (HCC)          DISPOSITION/PLAN   DISPOSITION Decision To Discharge 12/01/2024 09:43:39 PM         Discharge Note: The patient is stable for discharge home. The signs, symptoms, diagnosis, and discharge instructions have been discussed, understanding conveyed, and agreed upon. The patient is to follow up as recommended or return to ER should their symptoms worsen.      PATIENT REFERRED TO:  Darby Guzman PA-C  [address]   [facility] [address] [phone]    Schedule an appointment as soon as possible for a visit in 1 week  for followup    [facility] EMERGENCY DEPT  [address] [facility] [address] [phone]  Go to   As needed, If symptoms worsen.  Or for any concerns or deteriorations        DISCHARGE MEDICATIONS:     Medication List        START taking these medications      fluconazole 150 MG tablet  Commonly known as: DIFLUCAN  Take 1 tablet by mouth once for 1 dose  Start taking on: December 8, 2024            CONTINUE taking these medications      B-D ULTRAFINE III SHORT PEN 31G X 8 MM Misc  Generic drug: Insulin Pen Needle     nystatin 216471 UNIT/GM ointment  Commonly known as: MYCOSTATIN  Apply topically 2 times daily.            ASK your doctor about these medications      acetaminophen 500 MG tablet  Commonly known as: TYLENOL  Take 1 tablet by mouth every 6 hours as needed for Pain     cetirizine 10 MG tablet  Commonly known as: ZYRTEC     cyclobenzaprine 10 MG tablet  Commonly known as: FLEXERIL     * diclofenac sodium 1 % Gel  Commonly known as: VOLTAREN  Apply 2 g topically 2 times daily Apply 2 grams to affected area 4x a day     * diclofenac sodium 1 % Gel  Commonly known as: VOLTAREN  Apply 2 g topically 3 times daily as needed for Pain     * furosemide 40 MG tablet  Commonly known as: Lasix  Take 1 tablet by  Pain Refusal Text: I offered to prescribe pain medication but the patient refused to take this medication.

## 2024-12-04 LAB
C TRACH DNA SPEC QL NAA+PROBE: NEGATIVE
N GONORRHOEA DNA SPEC QL NAA+PROBE: NEGATIVE
SAMPLE TYPE: NORMAL
SERVICE CMNT-IMP: NORMAL
SPECIMEN SOURCE: NORMAL

## 2024-12-23 ENCOUNTER — HOSPITAL ENCOUNTER (EMERGENCY)
Facility: HOSPITAL | Age: 64
Discharge: HOME | End: 2024-12-23
Attending: EMERGENCY MEDICINE
Payer: MEDICARE

## 2024-12-23 VITALS
SYSTOLIC BLOOD PRESSURE: 137 MMHG | HEART RATE: 72 BPM | DIASTOLIC BLOOD PRESSURE: 92 MMHG | TEMPERATURE: 98.3 F | OXYGEN SATURATION: 99 % | RESPIRATION RATE: 18 BRPM

## 2024-12-23 DIAGNOSIS — J40 BRONCHITIS: Primary | ICD-10-CM

## 2024-12-23 PROCEDURE — 99283 EMERGENCY DEPT VISIT LOW MDM: CPT

## 2024-12-23 RX ORDER — ALBUTEROL SULFATE 90 UG/1
2 INHALANT RESPIRATORY (INHALATION) 4 TIMES DAILY PRN
Qty: 18 G | Refills: 0 | Status: SHIPPED | OUTPATIENT
Start: 2024-12-23

## 2024-12-23 RX ORDER — PREDNISONE 20 MG/1
20 TABLET ORAL DAILY
Qty: 5 TABLET | Refills: 0 | Status: SHIPPED | OUTPATIENT
Start: 2024-12-23 | End: 2024-12-28

## 2024-12-23 NOTE — ED TRIAGE NOTES
Reports cough, congestion since last week, went to PCP, also has rash to left testicle since last week as well, ambulatory with rollator to ER 3

## 2024-12-24 NOTE — ED PROVIDER NOTES
[facility] EMERGENCY DEPT  EMERGENCY DEPARTMENT HISTORY AND PHYSICAL EXAM      Date: 12/23/2024  Patient Name: Barry Singh  MRN: [id number]  YOB: 1960  Date of evaluation: 12/23/2024  Provider: Rhiannon Latif MD   Note Started: 7:07 PM EST 12/23/24    HISTORY OF PRESENT ILLNESS     Chief Complaint   Patient presents with    Chest Congestion    Rash       History Provided By: Patient    HPI: Barry Singh is a 64 y.o. male     PAST MEDICAL HISTORY   Past Medical History:  Past Medical History:   Diagnosis Date    Chronic kidney disease     Diabetes (HCC)     Hypertension        Past Surgical History:  Past Surgical History:   Procedure Laterality Date    ORTHOPEDIC SURGERY      TOTAL HIP ARTHROPLASTY         Family History:  History reviewed. No pertinent family history.    Social History:  Social History     Tobacco Use    Smoking status: Never    Smokeless tobacco: Never   Substance Use Topics    Alcohol use: Yes    Drug use: Not Currently       Allergies:  No Known Allergies    PCP: Darby Guzman PA-C    Current Meds:   No current facility-administered medications for this encounter.     Current Outpatient Medications   Medication Sig Dispense Refill    albuterol sulfate HFA (VENTOLIN HFA) 108 (90 Base) MCG/ACT inhaler Inhale 2 puffs into the lungs 4 times daily as needed for Wheezing 18 g 0    predniSONE (DELTASONE) 20 MG tablet Take 1 tablet by mouth daily for 5 days 5 tablet 0    nystatin (MYCOSTATIN) 619051 UNIT/GM ointment Apply topically 2 times daily. 30 g 0    dimethicone (CERAVE THERAPEUTIC HAND CREAM) 1 % cream Apply topically 2 times daily as needed. 59 mL 0    furosemide (LASIX) 40 MG tablet Take 2 tablets by mouth 2 times daily for 7 days Take 2 in the am and 2 at noon until you can see your primary 28 tablet 0    diclofenac sodium (VOLTAREN) 1 % GEL Apply 2 g topically 2 times daily Apply 2 grams to affected area 4x a day 200 g 8    diclofenac sodium (VOLTAREN) 1 % GEL Apply 2 g

## 2025-01-03 ENCOUNTER — TELEPHONE (OUTPATIENT)
Facility: HOSPITAL | Age: 65
End: 2025-01-03

## 2025-01-03 NOTE — TELEPHONE ENCOUNTER
Phone call made to patient 1/3/2025 at 9:51 AM to schedule Initial Evaluation appointment secondary to order received within office. Unable to leave voicemail at this time due to no v/m.      First call made on 12/30 and no v/m available at that time.

## 2025-01-10 NOTE — TELEPHONE ENCOUNTER
Phone call made to patient 1/10/2025 at 9:00 AM to schedule Initial Evaluation appointment secondary to order received within office. Unable to reach patient secondary to phone number has been disconnected.

## 2025-02-08 ENCOUNTER — HOSPITAL ENCOUNTER (EMERGENCY)
Facility: HOSPITAL | Age: 65
Discharge: HOME | End: 2025-02-08
Attending: EMERGENCY MEDICINE
Payer: MEDICARE

## 2025-02-08 VITALS
TEMPERATURE: 98 F | OXYGEN SATURATION: 99 % | SYSTOLIC BLOOD PRESSURE: 136 MMHG | RESPIRATION RATE: 18 BRPM | BODY MASS INDEX: 40.32 KG/M2 | HEIGHT: 68 IN | HEART RATE: 80 BPM | DIASTOLIC BLOOD PRESSURE: 98 MMHG | WEIGHT: 266 LBS

## 2025-02-08 DIAGNOSIS — J06.9 VIRAL URI: Primary | ICD-10-CM

## 2025-02-08 PROCEDURE — 87636 SARSCOV2 & INF A&B AMP PRB: CPT

## 2025-02-08 PROCEDURE — 99284 EMERGENCY DEPT VISIT MOD MDM: CPT

## 2025-02-08 RX ORDER — BENZONATATE 100 MG/1
100 CAPSULE ORAL 3 TIMES DAILY PRN
Qty: 15 CAPSULE | Refills: 0 | Status: SHIPPED | OUTPATIENT
Start: 2025-02-08 | End: 2025-02-13

## 2025-02-08 RX ORDER — CETIRIZINE HYDROCHLORIDE 10 MG/1
10 TABLET ORAL DAILY
Qty: 30 TABLET | Refills: 0 | Status: SHIPPED | OUTPATIENT
Start: 2025-02-08

## 2025-02-08 ASSESSMENT — PAIN - FUNCTIONAL ASSESSMENT
PAIN_FUNCTIONAL_ASSESSMENT: NONE - DENIES PAIN
PAIN_FUNCTIONAL_ASSESSMENT: 0-10
PAIN_FUNCTIONAL_ASSESSMENT: ACTIVITIES ARE NOT PREVENTED

## 2025-02-08 ASSESSMENT — PAIN SCALES - GENERAL: PAINLEVEL_OUTOF10: 0

## 2025-02-08 NOTE — ED TRIAGE NOTES
Patient presents for complaint of BLE edema R>L beginning last night.  Patient ambulatory into dept with a cane.  Patient also having cold symptoms including cough and runny nose.

## 2025-02-08 NOTE — ED PROVIDER NOTES
Pain 180 g 0    furosemide (LASIX) 40 MG tablet Take 1 tablet by mouth in the morning and 1 tablet in the evening. Do all this for 7 days. 14 tablet 0    acetaminophen (TYLENOL) 500 MG tablet Take 1 tablet by mouth every 6 hours as needed for Pain 40 tablet 1    cyclobenzaprine (FLEXERIL) 10 MG tablet Take 1 tablet by mouth 3 times daily as needed      cetirizine (ZYRTEC) 10 MG tablet TAKE 1 TABLET BY MOUTH EVERY DAY      ONETOUCH ULTRA strip CHECK BLOOD SUGAR TWICE A DAY E11.9      HUMALOG MIX 75/25 KWIKPEN (75-25) 100 UNIT/ML SUPN injection pen SUBCUTANEOUS 49 UNITS UNDER THE SKIN IN AM 45 UNITS EVERY PM      B-D ULTRAFINE III SHORT PEN 31G X 8 MM MISC USE ONCE WEEKLY WITH OZEMPIC      irbesartan (AVAPRO) 150 MG tablet TAKE 1 TABLET BY MOUTH EVERY DAY      TRADJENTA 5 MG tablet TAKE 1 TABLET BY MOUTH EVERY DAY      JANUVIA 50 MG tablet          Social Determinants of Health:   Social Determinants of Health     Tobacco Use: Low Risk  (2/8/2025)    Patient History     Smoking Tobacco Use: Never     Smokeless Tobacco Use: Never     Passive Exposure: Never   Alcohol Use: Not At Risk (2/8/2025)    AUDIT-C     Frequency of Alcohol Consumption: Never     Average Number of Drinks: Patient does not drink     Frequency of Binge Drinking: Never   Financial Resource Strain: Not on file   Food Insecurity: Not on file   Transportation Needs: Not on file   Physical Activity: Not on file   Stress: Not on file   Social Connections: Not on file   Intimate Partner Violence: Not on file   Depression: At risk (1/25/2023)    PHQ-2     PHQ-2 Score: 6   Housing Stability: Not on file   Interpersonal Safety: Not At Risk (2/8/2025)    Interpersonal Safety Domain Source: IP Abuse Screening     Physical abuse: Denies     Verbal abuse: Denies     Emotional abuse: Denies     Financial abuse: Denies     Sexual abuse: Denies   Utilities: Not on file       PHYSICAL EXAM   Physical Exam  Vitals and nursing note reviewed.   Constitutional:            ASK your doctor about these medications      acetaminophen 500 MG tablet  Commonly known as: TYLENOL  Take 1 tablet by mouth every 6 hours as needed for Pain     albuterol sulfate  (90 Base) MCG/ACT inhaler  Commonly known as: Ventolin HFA  Inhale 2 puffs into the lungs 4 times daily as needed for Wheezing     CeraVe Therapeutic Hand Cream 1 % cream  Generic drug: dimethicone  Apply topically 2 times daily as needed.     cetirizine 10 MG tablet  Commonly known as: ZYRTEC     cyclobenzaprine 10 MG tablet  Commonly known as: FLEXERIL     * diclofenac sodium 1 % Gel  Commonly known as: VOLTAREN  Apply 2 g topically 2 times daily Apply 2 grams to affected area 4x a day     * diclofenac sodium 1 % Gel  Commonly known as: VOLTAREN  Apply 2 g topically 3 times daily as needed for Pain     * furosemide 40 MG tablet  Commonly known as: Lasix  Take 1 tablet by mouth in the morning and 1 tablet in the evening. Do all this for 7 days.     * furosemide 40 MG tablet  Commonly known as: LASIX  Take 2 tablets by mouth 2 times daily for 7 days Take 2 in the am and 2 at noon until you can see your primary     HumaLOG Mix 75/25 KwikPen (75-25) 100 UNIT/ML Supn injection pen  Generic drug: insulin lispro protamine & lispro     irbesartan 150 MG tablet  Commonly known as: AVAPRO     Januvia 50 MG tablet  Generic drug: SITagliptin     nystatin 523626 UNIT/GM ointment  Commonly known as: MYCOSTATIN  Apply topically 2 times daily.     OneTouch Ultra strip  Generic drug: blood glucose test strips     Tradjenta 5 MG tablet  Generic drug: linagliptin           * This list has 4 medication(s) that are the same as other medications prescribed for you. Read the directions carefully, and ask your doctor or other care provider to review them with you.                    DISCONTINUED MEDICATIONS:  Current Discharge Medication List          I am the Primary Clinician of Record: Rhiannon Latif MD (electronically signed)    (Please

## 2025-02-09 NOTE — DISCHARGE INSTRUCTIONS
Please again read the enclosed instructions on limiting the sodium intake in your diet I have enclosed a video on high blood pressure and the DASH diet

## 2025-03-25 ENCOUNTER — HOSPITAL ENCOUNTER (EMERGENCY)
Facility: HOSPITAL | Age: 65
Discharge: HOME | End: 2025-03-25
Attending: STUDENT IN AN ORGANIZED HEALTH CARE EDUCATION/TRAINING PROGRAM
Payer: MEDICARE

## 2025-03-25 VITALS
SYSTOLIC BLOOD PRESSURE: 146 MMHG | HEIGHT: 68 IN | OXYGEN SATURATION: 98 % | DIASTOLIC BLOOD PRESSURE: 103 MMHG | HEART RATE: 80 BPM | TEMPERATURE: 97.3 F | RESPIRATION RATE: 17 BRPM | BODY MASS INDEX: 40.32 KG/M2 | WEIGHT: 266 LBS

## 2025-03-25 DIAGNOSIS — R11.0 NAUSEA: Primary | ICD-10-CM

## 2025-03-25 LAB
ANION GAP SERPL CALC-SCNC: 6 MMOL/L (ref 2–12)
BASOPHILS # BLD: 0.02 K/UL (ref 0–0.1)
BASOPHILS NFR BLD: 0.3 % (ref 0–1)
BUN SERPL-MCNC: 11 MG/DL (ref 6–20)
BUN/CREAT SERPL: 9 (ref 12–20)
CA-I BLD-MCNC: 8.8 MG/DL (ref 8.5–10.1)
CHLORIDE SERPL-SCNC: 103 MMOL/L (ref 97–108)
CO2 SERPL-SCNC: 31 MMOL/L (ref 21–32)
CREAT SERPL-MCNC: 1.29 MG/DL (ref 0.7–1.3)
DIFFERENTIAL METHOD BLD: NORMAL
EOSINOPHIL # BLD: 0.06 K/UL (ref 0–0.4)
EOSINOPHIL NFR BLD: 0.8 % (ref 0–7)
ERYTHROCYTE [DISTWIDTH] IN BLOOD BY AUTOMATED COUNT: 14.1 % (ref 11.5–14.5)
GLUCOSE SERPL-MCNC: 296 MG/DL (ref 65–100)
HCT VFR BLD AUTO: 40.2 % (ref 36.6–50.3)
HGB BLD-MCNC: 13.7 G/DL (ref 12.1–17)
IMM GRANULOCYTES # BLD AUTO: 0.02 K/UL (ref 0–0.04)
IMM GRANULOCYTES NFR BLD AUTO: 0.3 % (ref 0–0.5)
LYMPHOCYTES # BLD: 1.67 K/UL (ref 0.8–3.5)
LYMPHOCYTES NFR BLD: 23.2 % (ref 12–49)
MCH RBC QN AUTO: 30.4 PG (ref 26–34)
MCHC RBC AUTO-ENTMCNC: 34.1 G/DL (ref 30–36.5)
MCV RBC AUTO: 89.1 FL (ref 80–99)
MONOCYTES # BLD: 0.49 K/UL (ref 0–1)
MONOCYTES NFR BLD: 6.8 % (ref 5–13)
NEUTS SEG # BLD: 4.94 K/UL (ref 1.8–8)
NEUTS SEG NFR BLD: 68.6 % (ref 32–75)
NRBC # BLD: 0 K/UL (ref 0–0.01)
NRBC BLD-RTO: 0 PER 100 WBC
PLATELET # BLD AUTO: 212 K/UL (ref 150–400)
PMV BLD AUTO: 11.1 FL (ref 8.9–12.9)
POTASSIUM SERPL-SCNC: 4.2 MMOL/L (ref 3.5–5.1)
RBC # BLD AUTO: 4.51 M/UL (ref 4.1–5.7)
SODIUM SERPL-SCNC: 140 MMOL/L (ref 136–145)
WBC # BLD AUTO: 7.2 K/UL (ref 4.1–11.1)

## 2025-03-25 PROCEDURE — 99284 EMERGENCY DEPT VISIT MOD MDM: CPT

## 2025-03-25 PROCEDURE — 6370000000 HC RX 637 (ALT 250 FOR IP): Performed by: STUDENT IN AN ORGANIZED HEALTH CARE EDUCATION/TRAINING PROGRAM

## 2025-03-25 PROCEDURE — 80048 BASIC METABOLIC PNL TOTAL CA: CPT

## 2025-03-25 PROCEDURE — 6360000002 HC RX W HCPCS: Performed by: STUDENT IN AN ORGANIZED HEALTH CARE EDUCATION/TRAINING PROGRAM

## 2025-03-25 PROCEDURE — 96374 THER/PROPH/DIAG INJ IV PUSH: CPT

## 2025-03-25 PROCEDURE — 85025 COMPLETE CBC W/AUTO DIFF WBC: CPT

## 2025-03-25 PROCEDURE — 36415 COLL VENOUS BLD VENIPUNCTURE: CPT

## 2025-03-25 RX ORDER — ONDANSETRON 4 MG/1
4 TABLET, ORALLY DISINTEGRATING ORAL 3 TIMES DAILY PRN
Qty: 21 TABLET | Refills: 0 | Status: SHIPPED | OUTPATIENT
Start: 2025-03-25

## 2025-03-25 RX ORDER — DICYCLOMINE HCL 20 MG
20 TABLET ORAL 4 TIMES DAILY
Qty: 30 TABLET | Refills: 0 | Status: SHIPPED | OUTPATIENT
Start: 2025-03-25 | End: 2025-03-25

## 2025-03-25 RX ORDER — ONDANSETRON 4 MG/1
4 TABLET, ORALLY DISINTEGRATING ORAL 3 TIMES DAILY PRN
Qty: 21 TABLET | Refills: 0 | Status: SHIPPED | OUTPATIENT
Start: 2025-03-25 | End: 2025-03-25

## 2025-03-25 RX ORDER — DICYCLOMINE HYDROCHLORIDE 10 MG/1
20 CAPSULE ORAL
Status: COMPLETED | OUTPATIENT
Start: 2025-03-25 | End: 2025-03-25

## 2025-03-25 RX ORDER — DICYCLOMINE HCL 20 MG
20 TABLET ORAL 4 TIMES DAILY
Qty: 30 TABLET | Refills: 0 | Status: SHIPPED | OUTPATIENT
Start: 2025-03-25

## 2025-03-25 RX ORDER — ONDANSETRON 2 MG/ML
4 INJECTION INTRAMUSCULAR; INTRAVENOUS ONCE
Status: COMPLETED | OUTPATIENT
Start: 2025-03-25 | End: 2025-03-25

## 2025-03-25 RX ADMIN — DICYCLOMINE HYDROCHLORIDE 20 MG: 10 CAPSULE ORAL at 12:54

## 2025-03-25 RX ADMIN — ONDANSETRON 4 MG: 2 INJECTION, SOLUTION INTRAMUSCULAR; INTRAVENOUS at 13:07

## 2025-03-25 ASSESSMENT — PAIN - FUNCTIONAL ASSESSMENT: PAIN_FUNCTIONAL_ASSESSMENT: 0-10

## 2025-03-25 ASSESSMENT — PAIN SCALES - GENERAL: PAINLEVEL_OUTOF10: 0

## 2025-03-25 NOTE — ED TRIAGE NOTES
PT reports he's been out of his \"fluid pills\" x 3 days. Reports right LE swelling and nausea that started last night with one episode of vomiting.

## 2025-03-25 NOTE — ED PROVIDER NOTES
[facility] EMERGENCY DEPT  EMERGENCY DEPARTMENT HISTORY AND PHYSICAL EXAM      Date: 3/25/2025  Patient Name: Barry Singh  MRN: [id number]  Birthdate 1960  Date of evaluation: 3/25/2025  Provider: Hardeep Alvares MD   Note Started: 1:52 PM EDT 3/25/25    HISTORY OF PRESENT ILLNESS     Chief Complaint   Patient presents with    Vomiting       History Provided By: Patient    HPI: Barry Singh is a 64 y.o. male with PMH and medication as below comes to the ED with generalized abdominal cramps and nausea.  He had 1 episode of vomiting yesterday but currently not have any vomiting but feels nauseous.  Is not having active abdominal pain.  No chest pain or shortness of breath.  Patient does not have any other symptoms concerns today.      PAST MEDICAL HISTORY   Past Medical History:  Past Medical History:   Diagnosis Date    Chronic kidney disease     Diabetes (HCC)     Hypertension        Past Surgical History:  Past Surgical History:   Procedure Laterality Date    ORTHOPEDIC SURGERY      TOTAL HIP ARTHROPLASTY         Family History:  History reviewed. No pertinent family history.    Social History:  Social History     Tobacco Use    Smoking status: Never     Passive exposure: Never    Smokeless tobacco: Never   Vaping Use    Vaping status: Never Used   Substance Use Topics    Alcohol use: Yes    Drug use: Not Currently       Allergies:  No Known Allergies    PCP: Darby Guzman PA-C    Current Meds:   No current facility-administered medications for this encounter.     Current Outpatient Medications   Medication Sig Dispense Refill    dicyclomine (BENTYL) 20 MG tablet Take 1 tablet by mouth 4 times daily 30 tablet 0    ondansetron (ZOFRAN-ODT) 4 MG disintegrating tablet Take 1 tablet by mouth 3 times daily as needed for Nausea or Vomiting 21 tablet 0    cetirizine (ZYRTEC) 10 MG tablet Take 1 tablet by mouth daily 30 tablet 0    albuterol sulfate HFA (VENTOLIN HFA) 108 (90 Base) MCG/ACT inhaler Inhale 2 puffs

## 2025-03-31 ENCOUNTER — HOSPITAL ENCOUNTER (OUTPATIENT)
Facility: HOSPITAL | Age: 65
Setting detail: RECURRING SERIES
Discharge: HOME | End: 2025-04-03
Payer: MEDICARE

## 2025-03-31 PROCEDURE — 97161 PT EVAL LOW COMPLEX 20 MIN: CPT

## 2025-03-31 NOTE — THERAPY EVALUATION
3   4   5   6   7   8   9   10     Nustep                     Bilat IT band st                     3-way hip                     Step-ups fwd/lat                     Leg press                     SKTC                     Lateral walking/tandem walking                   Knee flexion/ext press                   HR-TR                                   [x] Eval only for Running Visit #1, no treatment provided    Modalities to be included future treatment sessions: Vasopneumatic Compression  Has HEP been provided to patient? [x] No    [] Yes                                      Access Code:                    Date Provided:  [] Reviewed HEP    [] Progressed/Changed HEP, details:        GOALS  Short Term Goals, to be met within 5 treatments:  Patient will demonstrate independence and compliance with HEP in order to increase mobility and assist with carryover from PT services.  Status at last Eval/Progress Note: no HEP issued at this time  Current Status: see above, Initial Evaluation completed today  Goal Met?  No    2.  Pt to demonstrate the ability to perform TUG test in 15 seconds or less to demonstrate a decreased falls risk  Status at last Eval/Progress Note: currently at 22 seconds  Current Status: see above, Initial Evaluation completed today  Goal Met?  No      Long Term Goals, to be met within 10 treatments:   Pt to improve LE strength to no less than 4/5 so that he can stand for 20 minutes without needing to sit  Status at last Eval/Progress Note: Weakest muscle group strength is currently 3+/5   Current Status: see above, Initial Evaluation completed today  Goal Met?  No    2.  Pt to demonstrate the ability to perform hip flexion AROM to 90 degrees on the left so that he can sleep on his side comfortably at night without tossing and turning  Status at last Eval/Progress Note: currently 82 degrees and has to switch sides often during the night  Current Status: see above, Initial Evaluation completed

## 2025-04-04 ENCOUNTER — HOSPITAL ENCOUNTER (OUTPATIENT)
Facility: HOSPITAL | Age: 65
Setting detail: RECURRING SERIES
Discharge: HOME | End: 2025-04-07
Payer: MEDICARE

## 2025-04-04 PROCEDURE — 97110 THERAPEUTIC EXERCISES: CPT

## 2025-04-04 NOTE — PROGRESS NOTES
PHYSICAL THERAPY - MEDICARE DAILY TREATMENT NOTE (updated 3/23)    Date of Service: 2025        Patient Name:  Barry Singh :  1960   Medical   Diagnosis:  Bilateral leg weakness [R29.898] Treatment Diagnosis:  M62.81  GENERAL MUSCLE WEAKNESS and R26.81   Unsteadiness on feet    Referral Source:  Alyssa Felix FNP Insurance:   Payor: [facility] MEDICARE / Plan: [employer] DUAL COMPLETE / Product Type: *No Product type* /    [x] Patient's date of birth verified                      Visit #   Current  / Total 2 10   Time   In / Out 1345 1430   Total Treatment Time (in minutes) 45    Total Timed Codes (in minutes) 45    1:1 Treatment Time (in minutes) 45       [facility] Totals Reminder:  bill using total billable   min of TIMED therapeutic procedures and modalities.   8-22 min = 1 unit; 23-37 min = 2 units; 38-52 min = 3 units; 53-67 min = 4 units; 68-82 min = 5 units        SUBJECTIVE  Pain Level (0-10 scale): 8/10    Any medication changes, allergies to medications, adverse drug reactions, diagnosis change, or new procedure performed?: No  Medications: [x] Verified on Patient Summary List    Subjective functional status/changes:     \"My foot turn out to the side when I walk.\"    OBJECTIVE  Therapeutic Procedures:  Tx Min Billable or 1:1 Min (if diff from Tx Min) Procedure, Rationale, Specifics   45 45 25046 Therapeutic Exercise (timed):  increase ROM, strength, coordination, balance, and proprioception to improve patient's ability to progress to PLOF and address remaining functional goals. (see flow sheet as applicable)                   45 45    Total Total   [x] Patient Education billed concurrently with other procedures         Pain Level at end of session (0-10 scale): 8/10    Assessment   Patient tolerated treatment with supervision performing exercises for bilateral LE strengthening. Included standing and sitting exercises. Patient has shown improvement in endurance level since his last visit

## 2025-04-07 ENCOUNTER — HOSPITAL ENCOUNTER (OUTPATIENT)
Facility: HOSPITAL | Age: 65
Setting detail: RECURRING SERIES
Discharge: HOME | End: 2025-04-10
Payer: MEDICARE

## 2025-04-07 PROCEDURE — 97110 THERAPEUTIC EXERCISES: CPT

## 2025-04-07 NOTE — PROGRESS NOTES
GOALS  Short Term Goals, to be met within 5 treatments:  Patient will demonstrate independence and compliance with HEP in order to increase mobility and assist with carryover from PT services.  Status at last Eval/Progress Note: no HEP issued at this time  Current Status: provided   Goal Met?  No     2.  Pt to demonstrate the ability to perform TUG test in 15 seconds or less to demonstrate a decreased falls risk  Status at last Eval/Progress Note: currently at 22 seconds  Current Status: not tested  Goal Met?  No        Long Term Goals, to be met within 10 treatments:   Pt to improve LE strength to no less than 4/5 so that he can stand for 20 minutes without needing to sit  Status at last Eval/Progress Note: Weakest muscle group strength is currently 3+/5   Current Status: no change  Goal Met?  No     2.  Pt to demonstrate the ability to perform hip flexion AROM to 90 degrees on the left so that he can sleep on his side comfortably at night without tossing and turning  Status at last Eval/Progress Note: currently 82 degrees and has to switch sides often during the night  Current Status: no change  Goal Met?  No    []  See Progress Note/Recertification  []  See Discharge Summary    PLAN  [x]  Continue plan of care  [x]  Upgrade activities as tolerated  []  Discharge due to :  []  Other:      ERICK LONG PTA, FRANNY       4/7/2025       1:18 PM

## 2025-04-09 ENCOUNTER — HOSPITAL ENCOUNTER (OUTPATIENT)
Facility: HOSPITAL | Age: 65
Setting detail: RECURRING SERIES
Discharge: HOME | End: 2025-04-12
Payer: MEDICARE

## 2025-04-09 PROCEDURE — 97110 THERAPEUTIC EXERCISES: CPT

## 2025-04-09 NOTE — PROGRESS NOTES
PHYSICAL THERAPY - MEDICARE DAILY TREATMENT NOTE (updated 3/23)    Date of Service: 2025        Patient Name:  Barry Singh :  1960   Medical   Diagnosis:  Bilateral leg weakness [R29.898] Treatment Diagnosis:  M62.81  GENERAL MUSCLE WEAKNESS and R26.81   Unsteadiness on feet    Referral Source:  Alyssa Felix FNP Insurance:   Payor: [facility] MEDICARE / Plan: [employer] DUAL COMPLETE / Product Type: *No Product type* /    [x] Patient's date of birth verified                      Visit #   Current  / Total 4 10   Time   In / Out 1355 1450   Total Treatment Time (in minutes) 55    Total Timed Codes (in minutes) 55    1:1 Treatment Time (in minutes) 55       [facility] Totals Reminder:  bill using total billable   min of TIMED therapeutic procedures and modalities.   8-22 min = 1 unit; 23-37 min = 2 units; 38-52 min = 3 units; 53-67 min = 4 units; 68-82 min = 5 units        SUBJECTIVE  Pain Level (0-10 scale): 8/10    Any medication changes, allergies to medications, adverse drug reactions, diagnosis change, or new procedure performed?: No  Medications: [x] Verified on Patient Summary List    Subjective functional status/changes:     \"I have a difficult time getting up out of my chair without using my hands.      OBJECTIVE  Therapeutic Procedures:  Tx Min Billable or 1:1 Min (if diff from Tx Min) Procedure, Rationale, Specifics   55 55 [id number] Therapeutic Exercise (timed):  increase ROM, strength, coordination, balance, and proprioception to improve patient's ability to progress to PLOF and address remaining functional goals. (see flow sheet as applicable)                   55 55    Total Total   [x] Patient Education billed concurrently with other procedures       Pain Level at end of session (0-10 scale): 0/10    Assessment   Patient tolerated treatment for strengthening of both LE with supervision. Patient continues to require hand support with sit to stand and included wts. with standing exercises.

## 2025-04-15 ENCOUNTER — HOSPITAL ENCOUNTER (OUTPATIENT)
Facility: HOSPITAL | Age: 65
Setting detail: RECURRING SERIES
Discharge: HOME | End: 2025-04-18
Payer: MEDICARE

## 2025-04-15 PROCEDURE — 97150 GROUP THERAPEUTIC PROCEDURES: CPT

## 2025-04-15 PROCEDURE — 97110 THERAPEUTIC EXERCISES: CPT

## 2025-04-15 NOTE — PROGRESS NOTES
20 B  Ext-10# x 20 B   Flex- 20# x 30 B  Ext-15# x 20 B   Flex- 25# x 30 B                HR-TR            standing    Stairs x 20    Standing x 20 ea                 LAQ            x20  X 20  #2 x 20 with ball                 [x] Eval only for Running Visit #1, no treatment provided     Modalities to be included future treatment sessions: Vasopneumatic Compression  Has HEP been provided to patient? [x] No    [] Yes          Access Code: [email or web address]    Date printed: 04/07/2025   [] Reviewed HEP    [] Progressed/Changed HEP, details:         GOALS  Short Term Goals, to be met within 5 treatments:  Patient will demonstrate independence and compliance with HEP in order to increase mobility and assist with carryover from PT services.  Status at last Eval/Progress Note: no HEP issued at this time  Current Status: provided   Goal Met?  No     2.  Pt to demonstrate the ability to perform TUG test in 15 seconds or less to demonstrate a decreased falls risk  Status at last Eval/Progress Note: currently at 22 seconds  Current Status: not tested  Goal Met?  No        Long Term Goals, to be met within 10 treatments:   Pt to improve LE strength to no less than 4/5 so that he can stand for 20 minutes without needing to sit  Status at last Eval/Progress Note: Weakest muscle group strength is currently 3+/5   Current Status: no change  Goal Met?  No     2.  Pt to demonstrate the ability to perform hip flexion AROM to 90 degrees on the left so that he can sleep on his side comfortably at night without tossing and turning  Status at last Eval/Progress Note: currently 82 degrees and has to switch sides often during the night  Current Status: no change  Goal Met?  No    []  See Progress Note/Recertification  []  See Discharge Summary    PLAN  [x]  Continue plan of care  [x]  Upgrade activities as tolerated  []  Discharge due to :  []  Other:      ERICK LONG PTA, FRANNY       4/15/2025       2:37 PM

## 2025-04-17 ENCOUNTER — HOSPITAL ENCOUNTER (OUTPATIENT)
Facility: HOSPITAL | Age: 65
Setting detail: RECURRING SERIES
Discharge: HOME | End: 2025-04-20
Payer: MEDICARE

## 2025-04-17 PROCEDURE — 97110 THERAPEUTIC EXERCISES: CPT

## 2025-04-17 PROCEDURE — 97150 GROUP THERAPEUTIC PROCEDURES: CPT

## 2025-04-17 NOTE — PROGRESS NOTES
PHYSICAL THERAPY - MEDICARE DAILY TREATMENT NOTE (updated 3/23)    Date of Service: 2025        Patient Name:  Barry Singh :  1960   Medical   Diagnosis:  Bilateral leg weakness [R29.898] Treatment Diagnosis:  M62.81  GENERAL MUSCLE WEAKNESS, R26.81   Unsteadiness on feet, and R27.8     Other lack of coordination    Referral Source:  Alyssa Felix FNP Insurance:   Payor: [facility] MEDICARE / Plan: [employer] DUAL COMPLETE / Product Type: *No Product type* /    [x] Patient's date of birth verified                      Visit #   Current  / Total 6 10   Time   In / Out 13:32 14:29   Total Treatment Time (in minutes) 57    Total Timed Codes (in minutes) 57    1:1 Treatment Time (in minutes) 16       [facility] Totals Reminder:  bill using total billable   min of TIMED therapeutic procedures and modalities.   8-22 min = 1 unit; 23-37 min = 2 units; 38-52 min = 3 units; 53-67 min = 4 units; 68-82 min = 5 units        SUBJECTIVE  Pain Level (0-10 scale): 5/10    Any medication changes, allergies to medications, adverse drug reactions, diagnosis change, or new procedure performed?: No  Medications: [x] Verified on Patient Summary List    Subjective functional status/changes:     \"My back hurts..\"    OBJECTIVE  Therapeutic Procedures:  Tx Min Billable or 1:1 Min (if diff from Tx Min) Procedure, Rationale, Specifics   16 16 20092 Therapeutic Exercise (timed):  increase ROM, strength, coordination, balance, and proprioception to improve patient's ability to progress to PLOF and address remaining functional goals. (see flow sheet as applicable)   29  96906 Group Therapy (untimed): Billed while completing therapeutic exercise during beginning of treatment session to improve patient's ability to progress to PLOF and address remaining functional goals.  (see flow sheet as applicable)              57 16    Total Total   [x] Patient Education billed concurrently with other procedures     Pain Level at end of

## 2025-04-23 ENCOUNTER — HOSPITAL ENCOUNTER (OUTPATIENT)
Facility: HOSPITAL | Age: 65
Setting detail: RECURRING SERIES
Discharge: HOME | End: 2025-04-26
Payer: MEDICARE

## 2025-04-23 PROCEDURE — 97150 GROUP THERAPEUTIC PROCEDURES: CPT

## 2025-04-23 PROCEDURE — 97110 THERAPEUTIC EXERCISES: CPT

## 2025-04-23 NOTE — PROGRESS NOTES
PHYSICAL THERAPY - MEDICARE DAILY TREATMENT NOTE (updated 3/23)    Date of Service: 2025        Patient Name:  Barry Singh :  1960   Medical   Diagnosis:  Bilateral leg weakness [R29.898] Treatment Diagnosis:  M62.81  GENERAL MUSCLE WEAKNESS, R26.81   Unsteadiness on feet, and R27.8     Other lack of coordination    Referral Source:  Alyssa Felix FNP Insurance:   Payor: [facility] MEDICARE / Plan: [employer] DUAL COMPLETE / Product Type: *No Product type* /    [x] Patient's date of birth verified                      Visit #   Current  / Total 7 10   Time   In / Out 1355 1449   Total Treatment Time (in minutes) 54    Total Timed Codes (in minutes) 54    1:1 Treatment Time (in minutes) 30       [facility] Totals Reminder:  bill using total billable   min of TIMED therapeutic procedures and modalities.   8-22 min = 1 unit; 23-37 min = 2 units; 38-52 min = 3 units; 53-67 min = 4 units; 68-82 min = 5 units        SUBJECTIVE  Pain Level (0-10 scale): 0/10    Any medication changes, allergies to medications, adverse drug reactions, diagnosis change, or new procedure performed?: No  Medications: [x] Verified on Patient Summary List    Subjective functional status/changes:     \"Pt reports no issues since last visit .\"    OBJECTIVE  Therapeutic Procedures:  Tx Min Billable or 1:1 Min (if diff from Tx Min) Procedure, Rationale, Specifics   30 30 38037 Therapeutic Exercise (timed):  increase ROM, strength, coordination, balance, and proprioception to improve patient's ability to progress to PLOF and address remaining functional goals. (see flow sheet as applicable)   24  80790 Group Therapy (untimed): Billed while completing therapeutic exercise during end of treatment session to improve patient's ability to progress to PLOF and address remaining functional goals.  (see flow sheet as applicable)     54 30    Total Total   [x] Patient Education billed concurrently with other procedures       Pain Level at

## 2025-04-29 ENCOUNTER — HOSPITAL ENCOUNTER (OUTPATIENT)
Facility: HOSPITAL | Age: 65
Setting detail: RECURRING SERIES
Discharge: HOME | End: 2025-05-02
Payer: MEDICARE

## 2025-04-29 PROCEDURE — 97110 THERAPEUTIC EXERCISES: CPT

## 2025-04-29 NOTE — THERAPY EVALUATION
[facility]  [address] [place] [id number]  Ph: [phone]     Fax: [phone]    PHYSICAL THERAPY DISCHARGE SUMMARY  Patient Name: Barry Singh : 1960   Treatment/Medical Diagnosis: Bilateral leg weakness [R29.898]   Referral Source: Alyssa Felix FNP     Date of Initial Visit: 2025 Attended Visits: 8 Missed Visits: 1     SUMMARY OF TREATMENT/CURRENT STATUS  Patient presented to physical therapy with complaints of hip pain, decreased ROM, and lower extremity weakness limiting functional activities.  Pt has received treatment including lumbopelvic and lower extremity flexibility, ROM, strengthening, pelvic and core stabilization, functional activities, and education in posture and body mechanics.  Patient has demonstrated overall progress in strength, rom, pain level, and overall function.  Pt is planting in his garden, and able to walk increased distances with less pain, as compared to at IE.  Home exercise program reviewed today with no concerns voiced by patient.  Patient to be discharged at this time secondary to all goals met and current level of functional mobility. Thank you for this referral.     STATUS OF GOALS  Short Term Goals, to be met within 5 treatments:  Patient will demonstrate independence and compliance with HEP in order to increase mobility and assist with carryover from PT services.  Status at last Eval/Progress Note: no HEP issued at this time  Current Status: complete   Goal Met? yes     2.  Pt to demonstrate the ability to perform TUG test in 15 seconds or less to demonstrate a decreased falls risk  Status at last Eval/Progress Note: currently at 22  seconds  Current Status: 18 seconds with rollator  Goal Met?  Progressing        Long Term Goals, to be met within 10 treatments:   Pt to improve LE strength to no less than 4/5 so that he can stand for 20 minutes without needing to sit  Status at last Eval/Progress

## 2025-04-29 NOTE — PROGRESS NOTES
Eval only for Running Visit #1, no treatment provided     Modalities to be included future treatment sessions: Vasopneumatic Compression  Has HEP been provided to patient? [x] No    [] Yes          Access Code: [email or web address]    Date printed: 04/07/2025   [] Reviewed HEP    [] Progressed/Changed HEP, details:         GOALS  Short Term Goals, to be met within 5 treatments:  Patient will demonstrate independence and compliance with HEP in order to increase mobility and assist with carryover from PT services.  Status at last Eval/Progress Note: no HEP issued at this time  Current Status: complete   Goal Met? yes     2.  Pt to demonstrate the ability to perform TUG test in 15 seconds or less to demonstrate a decreased falls risk  Status at last Eval/Progress Note: currently at 22  seconds  Current Status: 18 seconds with rollator  Goal Met?  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   Goal Met?  Yes     2.  Pt to demonstrate the ability to perform hip flexion AROM to 90 degrees on the left so that he can sleep on his side comfortably at night without tossing and turning  Status at last Eval/Progress Note: currently 82 degrees and has to switch sides often during the night  Current Status: no change  Goal Met?  No    []  See Progress Note/Recertification  []  See Discharge Summary    PLAN  [x]  Continue plan of care  [x]  Upgrade activities as tolerated  []  Discharge due to :  []  Other:      Marcella Perez, PT, DPT       4/29/2025       1:29 PM
